# Patient Record
Sex: MALE | Race: WHITE | NOT HISPANIC OR LATINO | Employment: OTHER | URBAN - METROPOLITAN AREA
[De-identification: names, ages, dates, MRNs, and addresses within clinical notes are randomized per-mention and may not be internally consistent; named-entity substitution may affect disease eponyms.]

---

## 2017-03-07 ENCOUNTER — HOSPITAL ENCOUNTER (OUTPATIENT)
Dept: PET IMAGING | Facility: HOSPITAL | Age: 57
Discharge: HOME | End: 2017-03-07
Attending: INTERNAL MEDICINE

## 2017-03-09 ENCOUNTER — TELEPHONE (OUTPATIENT)
Dept: ONCOLOGY | Facility: CLINIC | Age: 57
End: 2017-03-09

## 2017-03-09 NOTE — TELEPHONE ENCOUNTER
----- Message from Erin Hassan sent at 3/9/2017  8:46 AM EST -----  Regarding: missed scan and has not called back to reschedule  Seeing Dr Leonardo   3/13

## 2017-03-13 ENCOUNTER — HOSPITAL ENCOUNTER (OUTPATIENT)
Dept: PET IMAGING | Facility: HOSPITAL | Age: 57
End: 2017-03-13
Attending: INTERNAL MEDICINE

## 2017-03-13 ENCOUNTER — APPOINTMENT (OUTPATIENT)
Dept: ONCOLOGY | Facility: CLINIC | Age: 57
End: 2017-03-13

## 2017-03-13 ENCOUNTER — APPOINTMENT (OUTPATIENT)
Dept: LAB | Facility: HOSPITAL | Age: 57
End: 2017-03-13

## 2017-03-17 ENCOUNTER — APPOINTMENT (OUTPATIENT)
Dept: ONCOLOGY | Facility: CLINIC | Age: 57
End: 2017-03-17

## 2017-03-17 ENCOUNTER — APPOINTMENT (OUTPATIENT)
Dept: LAB | Facility: HOSPITAL | Age: 57
End: 2017-03-17

## 2017-04-13 ENCOUNTER — LAB (OUTPATIENT)
Dept: LAB | Facility: HOSPITAL | Age: 57
End: 2017-04-13

## 2017-04-13 ENCOUNTER — HOSPITAL ENCOUNTER (OUTPATIENT)
Dept: PET IMAGING | Facility: HOSPITAL | Age: 57
Discharge: HOME | End: 2017-04-13
Attending: INTERNAL MEDICINE | Admitting: INTERNAL MEDICINE

## 2017-04-13 DIAGNOSIS — C83.18 MANTLE CELL LYMPHOMA OF LYMPH NODES OF MULTIPLE SITES (HCC): ICD-10-CM

## 2017-04-13 DIAGNOSIS — D80.1 HYPOGAMMAGLOBULINEMIA (HCC): ICD-10-CM

## 2017-04-13 LAB
ALBUMIN SERPL-MCNC: 4.2 G/DL (ref 3.5–5.2)
ALBUMIN/GLOB SERPL: 2.5 G/DL (ref 1.1–2.4)
ALP SERPL-CCNC: 83 U/L (ref 38–116)
ALT SERPL W P-5'-P-CCNC: 9 U/L (ref 0–41)
ANION GAP SERPL CALCULATED.3IONS-SCNC: 17 MMOL/L
AST SERPL-CCNC: 17 U/L (ref 0–40)
BASOPHILS # BLD AUTO: 0.07 10*3/MM3 (ref 0–0.1)
BASOPHILS NFR BLD AUTO: 1 % (ref 0–1.1)
BILIRUB SERPL-MCNC: <0.2 MG/DL (ref 0.1–1.2)
BUN BLD-MCNC: 18 MG/DL (ref 6–20)
BUN/CREAT SERPL: 19.8 (ref 7.3–30)
CALCIUM SPEC-SCNC: 8.9 MG/DL (ref 8.5–10.2)
CHLORIDE SERPL-SCNC: 102 MMOL/L (ref 98–107)
CO2 SERPL-SCNC: 25 MMOL/L (ref 22–29)
CREAT BLD-MCNC: 0.91 MG/DL (ref 0.7–1.3)
CREAT BLDA-MCNC: 1 MG/DL (ref 0.6–1.3)
DEPRECATED RDW RBC AUTO: 46.5 FL (ref 37–49)
EOSINOPHIL # BLD AUTO: 0.42 10*3/MM3 (ref 0–0.36)
EOSINOPHIL NFR BLD AUTO: 5.9 % (ref 1–5)
ERYTHROCYTE [DISTWIDTH] IN BLOOD BY AUTOMATED COUNT: 13.7 % (ref 11.7–14.5)
GFR SERPL CREATININE-BSD FRML MDRD: 86 ML/MIN/1.73
GLOBULIN UR ELPH-MCNC: 1.7 GM/DL (ref 1.8–3.5)
GLUCOSE BLD-MCNC: 158 MG/DL (ref 74–124)
HCT VFR BLD AUTO: 38.5 % (ref 40–49)
HGB BLD-MCNC: 12.9 G/DL (ref 13.5–16.5)
IGA1 MFR SER: 20 MG/DL (ref 70–400)
IGG1 SER-MCNC: 186 MG/DL (ref 700–1600)
IGM SERPL-MCNC: 13 MG/DL (ref 40–230)
IMM GRANULOCYTES # BLD: 0.03 10*3/MM3 (ref 0–0.03)
IMM GRANULOCYTES NFR BLD: 0.4 % (ref 0–0.5)
LDH SERPL-CCNC: 188 U/L (ref 99–259)
LYMPHOCYTES # BLD AUTO: 1.35 10*3/MM3 (ref 1–3.5)
LYMPHOCYTES NFR BLD AUTO: 18.9 % (ref 20–49)
MCH RBC QN AUTO: 30.8 PG (ref 27–33)
MCHC RBC AUTO-ENTMCNC: 33.5 G/DL (ref 32–35)
MCV RBC AUTO: 91.9 FL (ref 83–97)
MONOCYTES # BLD AUTO: 0.59 10*3/MM3 (ref 0.25–0.8)
MONOCYTES NFR BLD AUTO: 8.3 % (ref 4–12)
NEUTROPHILS # BLD AUTO: 4.69 10*3/MM3 (ref 1.5–7)
NEUTROPHILS NFR BLD AUTO: 65.5 % (ref 39–75)
NRBC BLD MANUAL-RTO: 0 /100 WBC (ref 0–0)
PLATELET # BLD AUTO: 268 10*3/MM3 (ref 150–375)
PMV BLD AUTO: 10 FL (ref 8.9–12.1)
POTASSIUM BLD-SCNC: 3.9 MMOL/L (ref 3.5–4.7)
PROT SERPL-MCNC: 5.9 G/DL (ref 6.3–8)
RBC # BLD AUTO: 4.19 10*6/MM3 (ref 4.3–5.5)
SODIUM BLD-SCNC: 144 MMOL/L (ref 134–145)
WBC NRBC COR # BLD: 7.15 10*3/MM3 (ref 4–10)

## 2017-04-13 PROCEDURE — 85025 COMPLETE CBC W/AUTO DIFF WBC: CPT | Performed by: INTERNAL MEDICINE

## 2017-04-13 PROCEDURE — 83615 LACTATE (LD) (LDH) ENZYME: CPT | Performed by: INTERNAL MEDICINE

## 2017-04-13 PROCEDURE — 82565 ASSAY OF CREATININE: CPT

## 2017-04-13 PROCEDURE — 36415 COLL VENOUS BLD VENIPUNCTURE: CPT | Performed by: INTERNAL MEDICINE

## 2017-04-13 PROCEDURE — 80053 COMPREHEN METABOLIC PANEL: CPT | Performed by: INTERNAL MEDICINE

## 2017-04-13 PROCEDURE — 0 DIATRIZOATE MEGLUMINE & SODIUM PER 1 ML: Performed by: INTERNAL MEDICINE

## 2017-04-13 PROCEDURE — 71260 CT THORAX DX C+: CPT

## 2017-04-13 PROCEDURE — 0 IOPAMIDOL 61 % SOLUTION: Performed by: INTERNAL MEDICINE

## 2017-04-13 PROCEDURE — 70491 CT SOFT TISSUE NECK W/DYE: CPT

## 2017-04-13 PROCEDURE — 74177 CT ABD & PELVIS W/CONTRAST: CPT

## 2017-04-13 RX ADMIN — IOPAMIDOL 85 ML: 612 INJECTION, SOLUTION INTRAVENOUS at 14:14

## 2017-04-13 RX ADMIN — DIATRIZOATE MEGLUMINE AND DIATRIZOATE SODIUM 30 ML: 660; 100 LIQUID ORAL; RECTAL at 13:30

## 2017-04-20 ENCOUNTER — APPOINTMENT (OUTPATIENT)
Dept: ONCOLOGY | Facility: CLINIC | Age: 57
End: 2017-04-20

## 2017-04-20 ENCOUNTER — APPOINTMENT (OUTPATIENT)
Dept: LAB | Facility: HOSPITAL | Age: 57
End: 2017-04-20

## 2017-04-20 ENCOUNTER — TELEPHONE (OUTPATIENT)
Dept: ONCOLOGY | Facility: CLINIC | Age: 57
End: 2017-04-20

## 2017-04-20 NOTE — TELEPHONE ENCOUNTER
----- Message from Brianna Davenport sent at 4/20/2017 11:31 AM EDT -----   Pt's wife is because he cannot come for his appointment today and wants to know if MD would call her with CT Results        [phone]    Pt. Wife is asking if dr. Leonardo can call pt. With scan results.  Pt. Has missed many appts. And has also been a no show in the past.  Last seen here by dr. Leonardo was 10/6/16.  Will d/w dr. Leonardo and get back with pt wife.  V/u.

## 2017-04-24 ENCOUNTER — DOCUMENTATION (OUTPATIENT)
Dept: ONCOLOGY | Facility: CLINIC | Age: 57
End: 2017-04-24

## 2017-04-24 NOTE — PROGRESS NOTES
April 24, 2107          Dear Mr. Murray,      I received a phone message from your wife dated 4/20/17 asking me to call with your CT scan results that were performed on 4/13/17 as you were not able to make the appointment.  I have attempted to call the home number we have listed [phone] and also called the cell phone number that was left by your wife which is [phone], but I have been unable to reach anyone.    The CT scan performed 4/13/17 did show slight increase in the size of lymph nodes in the neck, above the collarbones, and some in the back area.  I feel like you need further evaluation potentially with PET scan and possible biopsies etc.  You are welcome to contact my office at your earliest convenience to arrange a follow-up appointment so that we can discuss further testing.    If you are currently seeking care at a different practice, I would be more than happy to discuss with your current physician or for forward any records that they may need to continue your care.      Sincerely,            Eren Leonardo MD  [facility]  [phone]

## 2017-04-25 ENCOUNTER — TELEPHONE (OUTPATIENT)
Dept: ONCOLOGY | Facility: CLINIC | Age: 57
End: 2017-04-25

## 2017-04-25 DIAGNOSIS — C83.13 MANTLE CELL LYMPHOMA OF INTRA-ABDOMINAL LYMPH NODES (HCC): Primary | ICD-10-CM

## 2017-04-28 ENCOUNTER — HOSPITAL ENCOUNTER (OUTPATIENT)
Dept: PET IMAGING | Facility: HOSPITAL | Age: 57
Discharge: HOME | End: 2017-04-28
Attending: INTERNAL MEDICINE | Admitting: INTERNAL MEDICINE

## 2017-04-28 ENCOUNTER — HOSPITAL ENCOUNTER (OUTPATIENT)
Dept: PET IMAGING | Facility: HOSPITAL | Age: 57
Discharge: HOME | End: 2017-04-28
Attending: INTERNAL MEDICINE

## 2017-04-28 DIAGNOSIS — C83.13 MANTLE CELL LYMPHOMA OF INTRA-ABDOMINAL LYMPH NODES (HCC): ICD-10-CM

## 2017-04-28 PROCEDURE — 0 FLUDEOXYGLUCOSE F18 SOLUTION: Performed by: INTERNAL MEDICINE

## 2017-04-28 PROCEDURE — 78815 PET IMAGE W/CT SKULL-THIGH: CPT

## 2017-04-28 PROCEDURE — A9552 F18 FDG: HCPCS | Performed by: INTERNAL MEDICINE

## 2017-04-28 RX ADMIN — FLUDEOXYGLUCOSE F18 1 DOSE: 300 INJECTION INTRAVENOUS at 09:21

## 2017-05-03 ENCOUNTER — OFFICE VISIT (OUTPATIENT)
Dept: ONCOLOGY | Facility: CLINIC | Age: 57
End: 2017-05-03

## 2017-05-03 ENCOUNTER — APPOINTMENT (OUTPATIENT)
Dept: LAB | Facility: HOSPITAL | Age: 57
End: 2017-05-03

## 2017-05-03 VITALS
OXYGEN SATURATION: 98 % | TEMPERATURE: 98.3 F | BODY MASS INDEX: 25.04 KG/M2 | RESPIRATION RATE: 12 BRPM | DIASTOLIC BLOOD PRESSURE: 82 MMHG | SYSTOLIC BLOOD PRESSURE: 124 MMHG | HEIGHT: 66 IN | HEART RATE: 69 BPM | WEIGHT: 155.8 LBS

## 2017-05-03 DIAGNOSIS — C83.18 MANTLE CELL LYMPHOMA OF LYMPH NODES OF MULTIPLE SITES (HCC): Primary | ICD-10-CM

## 2017-05-03 PROCEDURE — 99212-NC PR NO CHARGE CBC OFFICE OUTPATIENT VISIT 10 MINUTES: Performed by: INTERNAL MEDICINE

## 2017-05-03 RX ORDER — ESCITALOPRAM OXALATE 10 MG/1
TABLET ORAL
COMMUNITY
Start: 2017-03-17 | End: 2017-05-23 | Stop reason: DRUGHIGH

## 2017-05-03 RX ORDER — BACLOFEN 10 MG/1
TABLET ORAL AS NEEDED
COMMUNITY
Start: 2017-04-01

## 2017-05-08 ENCOUNTER — DOCUMENTATION (OUTPATIENT)
Dept: ONCOLOGY | Facility: CLINIC | Age: 57
End: 2017-05-08

## 2017-05-23 ENCOUNTER — OFFICE VISIT (OUTPATIENT)
Dept: FAMILY MEDICINE CLINIC | Facility: CLINIC | Age: 57
End: 2017-05-23

## 2017-05-23 ENCOUNTER — TELEPHONE (OUTPATIENT)
Dept: FAMILY MEDICINE CLINIC | Facility: CLINIC | Age: 57
End: 2017-05-23

## 2017-05-23 VITALS
SYSTOLIC BLOOD PRESSURE: 150 MMHG | OXYGEN SATURATION: 98 % | BODY MASS INDEX: 25.08 KG/M2 | HEART RATE: 86 BPM | RESPIRATION RATE: 16 BRPM | TEMPERATURE: 97.6 F | WEIGHT: 159.8 LBS | DIASTOLIC BLOOD PRESSURE: 80 MMHG | HEIGHT: 67 IN

## 2017-05-23 DIAGNOSIS — I10 ESSENTIAL HYPERTENSION: ICD-10-CM

## 2017-05-23 DIAGNOSIS — F41.8 DEPRESSION WITH ANXIETY: ICD-10-CM

## 2017-05-23 DIAGNOSIS — Z72.0 TOBACCO ABUSE: ICD-10-CM

## 2017-05-23 DIAGNOSIS — T30.0 FLASH BURN: ICD-10-CM

## 2017-05-23 DIAGNOSIS — E11.9 TYPE 2 DIABETES MELLITUS WITHOUT COMPLICATION, WITHOUT LONG-TERM CURRENT USE OF INSULIN (HCC): ICD-10-CM

## 2017-05-23 DIAGNOSIS — C83.18 MANTLE CELL LYMPHOMA OF LYMPH NODES OF MULTIPLE SITES (HCC): Primary | ICD-10-CM

## 2017-05-23 PROCEDURE — 99214 OFFICE O/P EST MOD 30 MIN: CPT | Performed by: NURSE PRACTITIONER

## 2017-05-23 RX ORDER — HYDROXYZINE HYDROCHLORIDE 25 MG/1
25 TABLET, FILM COATED ORAL EVERY 8 HOURS PRN
Qty: 90 TABLET | Refills: 1 | Status: SHIPPED | OUTPATIENT
Start: 2017-05-23 | End: 2017-07-21 | Stop reason: SDUPTHER

## 2017-05-23 RX ORDER — MONTELUKAST SODIUM 10 MG/1
TABLET ORAL
COMMUNITY
Start: 2017-05-03 | End: 2017-06-20 | Stop reason: SDUPTHER

## 2017-05-23 RX ORDER — ESCITALOPRAM OXALATE 20 MG/1
20 TABLET ORAL DAILY
Qty: 30 TABLET | Refills: 1 | Status: SHIPPED | OUTPATIENT
Start: 2017-05-23 | End: 2017-06-20 | Stop reason: SDUPTHER

## 2017-06-20 RX ORDER — ESCITALOPRAM OXALATE 20 MG/1
20 TABLET ORAL DAILY
Qty: 90 TABLET | Refills: 0 | Status: SHIPPED | OUTPATIENT
Start: 2017-06-20 | End: 2017-08-30 | Stop reason: SDUPTHER

## 2017-06-20 RX ORDER — MONTELUKAST SODIUM 10 MG/1
10 TABLET ORAL NIGHTLY
Qty: 90 TABLET | Refills: 0 | Status: SHIPPED | OUTPATIENT
Start: 2017-06-20 | End: 2017-08-30 | Stop reason: SDUPTHER

## 2017-06-20 RX ORDER — LISINOPRIL 40 MG/1
40 TABLET ORAL DAILY
Qty: 90 TABLET | Refills: 0 | Status: SHIPPED | OUTPATIENT
Start: 2017-06-20 | End: 2017-08-30 | Stop reason: SDUPTHER

## 2017-06-20 RX ORDER — GABAPENTIN 300 MG/1
300 CAPSULE ORAL 3 TIMES DAILY
Qty: 270 CAPSULE | Refills: 0 | Status: SHIPPED | OUTPATIENT
Start: 2017-06-20 | End: 2017-09-01 | Stop reason: SDUPTHER

## 2017-06-20 RX ORDER — METOPROLOL TARTRATE 50 MG/1
50 TABLET, FILM COATED ORAL DAILY
Qty: 90 TABLET | Refills: 0 | Status: SHIPPED | OUTPATIENT
Start: 2017-06-20 | End: 2017-08-30 | Stop reason: SDUPTHER

## 2017-06-20 RX ORDER — OMEPRAZOLE 20 MG/1
20 CAPSULE, DELAYED RELEASE ORAL DAILY
Qty: 90 CAPSULE | Refills: 0 | Status: SHIPPED | OUTPATIENT
Start: 2017-06-20 | End: 2017-08-30 | Stop reason: SDUPTHER

## 2017-06-20 NOTE — TELEPHONE ENCOUNTER
Received refill request from [facility] pharmacy. Refilled for 90 days no refills. Will be due for FU

## 2017-07-10 ENCOUNTER — TELEPHONE (OUTPATIENT)
Dept: FAMILY MEDICINE CLINIC | Facility: CLINIC | Age: 57
End: 2017-07-10

## 2017-07-10 NOTE — TELEPHONE ENCOUNTER
Pt informed he needs to do a follow up. Made appt    ----- Message from ILIANA Parrish sent at 7/10/2017 10:08 AM EDT -----  Reviewed notes from [facility], pls make FU apt to discuss mood and DM, may need updated labs    ----- Message -----     From: Tom Henley     Sent: 7/6/2017   1:08 PM       To: ILIANA Parrish

## 2017-07-21 RX ORDER — HYDROXYZINE HYDROCHLORIDE 25 MG/1
25 TABLET, FILM COATED ORAL EVERY 8 HOURS PRN
Qty: 90 TABLET | Refills: 1 | Status: SHIPPED | OUTPATIENT
Start: 2017-07-21 | End: 2017-11-07 | Stop reason: SDUPTHER

## 2017-07-24 ENCOUNTER — OFFICE VISIT (OUTPATIENT)
Dept: FAMILY MEDICINE CLINIC | Facility: CLINIC | Age: 57
End: 2017-07-24

## 2017-07-24 VITALS
SYSTOLIC BLOOD PRESSURE: 128 MMHG | HEIGHT: 67 IN | WEIGHT: 163 LBS | DIASTOLIC BLOOD PRESSURE: 78 MMHG | HEART RATE: 82 BPM | TEMPERATURE: 98.4 F | BODY MASS INDEX: 25.58 KG/M2 | OXYGEN SATURATION: 96 %

## 2017-07-24 DIAGNOSIS — Z12.5 PROSTATE CANCER SCREENING: ICD-10-CM

## 2017-07-24 DIAGNOSIS — C83.18 MANTLE CELL LYMPHOMA OF LYMPH NODES OF MULTIPLE SITES (HCC): ICD-10-CM

## 2017-07-24 DIAGNOSIS — M79.641 BILATERAL HAND PAIN: ICD-10-CM

## 2017-07-24 DIAGNOSIS — Z11.59 NEED FOR HEPATITIS C SCREENING TEST: ICD-10-CM

## 2017-07-24 DIAGNOSIS — I10 ESSENTIAL HYPERTENSION: ICD-10-CM

## 2017-07-24 DIAGNOSIS — E11.9 TYPE 2 DIABETES MELLITUS WITHOUT COMPLICATION, WITHOUT LONG-TERM CURRENT USE OF INSULIN (HCC): Primary | ICD-10-CM

## 2017-07-24 DIAGNOSIS — Z72.0 TOBACCO ABUSE: ICD-10-CM

## 2017-07-24 DIAGNOSIS — M10.9 GOUT OF LEFT FOOT, UNSPECIFIED CAUSE, UNSPECIFIED CHRONICITY: ICD-10-CM

## 2017-07-24 DIAGNOSIS — M79.642 BILATERAL HAND PAIN: ICD-10-CM

## 2017-07-24 LAB
ALBUMIN SERPL-MCNC: 4.3 G/DL (ref 3.5–5.2)
ALBUMIN UR-MCNC: 0 MG/L (ref 0–20)
ALBUMIN/GLOB SERPL: 1.7 G/DL
ALP SERPL-CCNC: 84 U/L (ref 39–117)
ALT SERPL W P-5'-P-CCNC: 12 U/L (ref 1–41)
ANION GAP SERPL CALCULATED.3IONS-SCNC: 15.9 MMOL/L
AST SERPL-CCNC: 19 U/L (ref 1–40)
BACTERIA UR QL AUTO: NORMAL /HPF
BILIRUB SERPL-MCNC: 0.3 MG/DL (ref 0.1–1.2)
BILIRUB UR QL STRIP: NEGATIVE
BUN BLD-MCNC: 15 MG/DL (ref 6–20)
BUN/CREAT SERPL: 19.2 (ref 7–25)
CALCIUM SPEC-SCNC: 9.6 MG/DL (ref 8.6–10.5)
CHLORIDE SERPL-SCNC: 100 MMOL/L (ref 98–107)
CHROMATIN AB SERPL-ACNC: <10 IU/ML (ref 0–14)
CLARITY UR: CLEAR
CO2 SERPL-SCNC: 23.1 MMOL/L (ref 22–29)
COLOR UR: YELLOW
CREAT BLD-MCNC: 0.78 MG/DL (ref 0.76–1.27)
CRP SERPL-MCNC: 1.62 MG/DL (ref 0–0.5)
ERYTHROCYTE [DISTWIDTH] IN BLOOD BY AUTOMATED COUNT: 13.6 % (ref 4.5–15)
ERYTHROCYTE [SEDIMENTATION RATE] IN BLOOD: 10 MM/HR (ref 0–20)
GFR SERPL CREATININE-BSD FRML MDRD: 103 ML/MIN/1.73
GLOBULIN UR ELPH-MCNC: 2.5 GM/DL
GLUCOSE BLD-MCNC: 105 MG/DL (ref 65–99)
GLUCOSE UR STRIP-MCNC: NEGATIVE MG/DL
HBA1C MFR BLD: 6.1 % (ref 4.8–5.6)
HCT VFR BLD AUTO: 43.2 % (ref 35–60)
HCV AB SER DONR QL: NORMAL
HGB BLD-MCNC: 14 G/DL (ref 13.5–18)
HGB UR QL STRIP.AUTO: ABNORMAL
KETONES UR QL STRIP: NEGATIVE
LEUKOCYTE ESTERASE UR QL STRIP.AUTO: NEGATIVE
LYMPHOCYTES # BLD AUTO: 1.2 10*3/MM3 (ref 1.2–3.4)
LYMPHOCYTES NFR BLD AUTO: 19 % (ref 21–51)
MCH RBC QN AUTO: 29.7 PG (ref 26.1–33.1)
MCHC RBC AUTO-ENTMCNC: 32.5 G/DL (ref 33–37)
MCV RBC AUTO: 91.5 FL (ref 80–99)
MONOCYTES # BLD AUTO: 0.5 10*3/MM3 (ref 0.1–0.6)
MONOCYTES NFR BLD AUTO: 7.2 % (ref 2–9)
NEUTROPHILS # BLD AUTO: 4.8 10*3/MM3 (ref 1.4–6.5)
NEUTROPHILS NFR BLD AUTO: 73.8 % (ref 42–75)
NITRITE UR QL STRIP: NEGATIVE
PH UR STRIP.AUTO: 5.5 [PH] (ref 4.6–8)
PLATELET # BLD AUTO: 279 10*3/MM3 (ref 150–450)
PMV BLD AUTO: 6.5 FL (ref 7.1–10.5)
POTASSIUM BLD-SCNC: 4.3 MMOL/L (ref 3.5–5.2)
PROT SERPL-MCNC: 6.8 G/DL (ref 6–8.5)
PROT UR QL STRIP: NEGATIVE
PSA SERPL-MCNC: 0.38 NG/ML (ref 0–4)
RBC # BLD AUTO: 4.72 10*6/MM3 (ref 4–6)
RBC # UR: NORMAL /HPF
REF LAB TEST METHOD: NORMAL
SODIUM BLD-SCNC: 139 MMOL/L (ref 136–145)
SP GR UR STRIP: <=1.005 (ref 1–1.03)
SQUAMOUS #/AREA URNS HPF: NORMAL /HPF
TSH SERPL DL<=0.05 MIU/L-ACNC: 1.13 MIU/ML (ref 0.27–4.2)
URATE SERPL-MCNC: 5.6 MG/DL (ref 3.4–7)
UROBILINOGEN UR QL STRIP: ABNORMAL
WBC NRBC COR # BLD: 6.5 10*3/MM3 (ref 4.5–10)
WBC UR QL AUTO: NORMAL /HPF

## 2017-07-24 PROCEDURE — 84550 ASSAY OF BLOOD/URIC ACID: CPT | Performed by: NURSE PRACTITIONER

## 2017-07-24 PROCEDURE — 36415 COLL VENOUS BLD VENIPUNCTURE: CPT | Performed by: NURSE PRACTITIONER

## 2017-07-24 PROCEDURE — 86038 ANTINUCLEAR ANTIBODIES: CPT | Performed by: NURSE PRACTITIONER

## 2017-07-24 PROCEDURE — 99214 OFFICE O/P EST MOD 30 MIN: CPT | Performed by: NURSE PRACTITIONER

## 2017-07-24 PROCEDURE — 82043 UR ALBUMIN QUANTITATIVE: CPT | Performed by: NURSE PRACTITIONER

## 2017-07-24 PROCEDURE — 80053 COMPREHEN METABOLIC PANEL: CPT | Performed by: NURSE PRACTITIONER

## 2017-07-24 PROCEDURE — 85025 COMPLETE CBC W/AUTO DIFF WBC: CPT | Performed by: NURSE PRACTITIONER

## 2017-07-24 PROCEDURE — 86140 C-REACTIVE PROTEIN: CPT | Performed by: NURSE PRACTITIONER

## 2017-07-24 PROCEDURE — 85652 RBC SED RATE AUTOMATED: CPT | Performed by: NURSE PRACTITIONER

## 2017-07-24 PROCEDURE — 83036 HEMOGLOBIN GLYCOSYLATED A1C: CPT | Performed by: NURSE PRACTITIONER

## 2017-07-24 PROCEDURE — 86803 HEPATITIS C AB TEST: CPT | Performed by: NURSE PRACTITIONER

## 2017-07-24 PROCEDURE — 84443 ASSAY THYROID STIM HORMONE: CPT | Performed by: NURSE PRACTITIONER

## 2017-07-24 PROCEDURE — 84153 ASSAY OF PSA TOTAL: CPT | Performed by: NURSE PRACTITIONER

## 2017-07-24 PROCEDURE — 86431 RHEUMATOID FACTOR QUANT: CPT | Performed by: NURSE PRACTITIONER

## 2017-07-24 PROCEDURE — 81001 URINALYSIS AUTO W/SCOPE: CPT | Performed by: NURSE PRACTITIONER

## 2017-07-24 RX ORDER — BUPROPION HYDROCHLORIDE 150 MG/1
TABLET, EXTENDED RELEASE ORAL
Qty: 60 TABLET | Refills: 2 | Status: SHIPPED | OUTPATIENT
Start: 2017-07-24 | End: 2017-08-30 | Stop reason: SINTOL

## 2017-07-24 NOTE — PROGRESS NOTES
Subjective   Abdelrahman Murray is a 57 y.o. male.     History of Present Illness   Here to FU on DM2 on meformin 500 mg daily, occ takes lantus prn doesn't freq use d/t well controlled BS, checks BS at home 103, last dilated eye exam > 1 year, with L foot pain x few mo fairly constant pain 4/10, with hx of gout years ago tx medication wondering if L LE pain is r/t gout, wears high boots d/t ankle OA, no rubbing or skin breakdown  With HTN on metoprolol and lisinopril 40 mg daily, checks BP at home states runs similar or higher, no CP HA dizziness LE edema  With lumbar pain and radiculopathy on gabapentin 300 mg, baclofen 10 mg, seeing chiropractor helping somewhat, wondering if L LE pain could be sciatica with hx, no injury, trauma or trigger event, c/o B hand pain swelling and stiffness wondering if has OA or needs to start medication  With gerd sx stable on omeprazole 20 mg   With depression and anxiety on lexapro 20 mg daily and hydroxyzine 25 mg thinks helped at first but no longer helping, smoker 1 ppd x 38 years, never tried wellbutrin  Saw Dr Hamilton [facility] for mantle cell and monitoring elevated lymph nodes doesn't recommend continuing chemo    The following portions of the patient's history were reviewed and updated as appropriate: allergies, current medications, past family history, past medical history, past social history, past surgical history and problem list.    Review of Systems   Constitutional: Negative for fever.   Respiratory: Negative for cough, shortness of breath and wheezing.    Cardiovascular: Negative for chest pain, palpitations and leg swelling.   Gastrointestinal: Negative for abdominal distention and abdominal pain.        Gerd   Endocrine: Negative for cold intolerance, heat intolerance, polydipsia, polyphagia and polyuria.   Musculoskeletal: Positive for arthralgias, back pain, joint swelling and myalgias. Negative for gait problem, neck pain and neck stiffness.    Allergic/Immunologic: Positive for environmental allergies.   Neurological: Positive for numbness. Negative for dizziness and headaches.   Hematological: Positive for adenopathy.   Psychiatric/Behavioral: Positive for agitation, dysphoric mood and sleep disturbance. Negative for behavioral problems, confusion, decreased concentration, hallucinations, self-injury and suicidal ideas. The patient is nervous/anxious. The patient is not hyperactive.    All other systems reviewed and are negative.      Objective   Physical Exam   Constitutional: He is oriented to person, place, and time. He appears well-developed and well-nourished.   HENT:   Head: Normocephalic and atraumatic.   Eyes: Conjunctivae and EOM are normal. Pupils are equal, round, and reactive to light.   Cardiovascular: Normal rate, regular rhythm and normal heart sounds.    Pulmonary/Chest: Effort normal and breath sounds normal.   Musculoskeletal: Normal range of motion. He exhibits tenderness (L lateral shin no erythema or edema, no skin breakdown, FROM L foot and ankle).    Abdelrahman had a diabetic foot exam performed (sensation intact, pulses strong, well hydrated, fungal toenails, no ulcers) today.  Neurological: He is alert and oriented to person, place, and time.   Skin: Skin is warm and dry.   Psychiatric: His behavior is normal. Judgment and thought content normal.   hyperverbal   Vitals reviewed.      Assessment/Plan   Abdelrahman was seen today for follow-up and gout.    Diagnoses and all orders for this visit:    Type 2 diabetes mellitus without complication, without long-term current use of insulin  -     CBC & Differential  -     Comprehensive Metabolic Panel  -     TSH  -     Hemoglobin A1c  -     MicroAlbumin, Urine, Random  -     Urinalysis With Microscopic  -     CBC Auto Differential  -     Urinalysis  -     Urinalysis, Microscopic Only    Essential hypertension  -     Comprehensive Metabolic Panel  -     TSH    Tobacco abuse    Prostate cancer  screening  -     PSA    Gout of left foot, unspecified cause, unspecified chronicity  -     Uric Acid    Need for hepatitis C screening test  -     Hepatitis C Antibody    Bilateral hand pain  -     KYM  -     C-reactive Protein  -     Rheumatoid Factor, Quant  -     Sedimentation Rate    Mantle cell lymphoma of lymph nodes of multiple sites    Other orders  -     buPROPion SR (WELLBUTRIN SR) 150 MG 12 hr tablet; 1 PO AM x 3 days then 1 PO BID    check labs and call with results, cont all chronic dz meds, trial wellbutrin  mg 1 PO AM x 3 days then BID and enc smoking cessation, will re-eval mood in 1 mo FU apt, cont FU with [place] [facility], check BP and BS at home, enc overdue dilated eye exam, DM foot exam

## 2017-07-24 NOTE — PATIENT INSTRUCTIONS
check labs and call with results, cont all chronic dz meds, trial wellbutrin  mg 1 PO AM x 3 days then BID and enc smoking cessation, will re-eval mood in 1 mo FU apt, cont FU with [facility], check BP and BS at home, enc overdue dilated eye exam, DM foot exam

## 2017-07-25 ENCOUNTER — TELEPHONE (OUTPATIENT)
Dept: FAMILY MEDICINE CLINIC | Facility: CLINIC | Age: 57
End: 2017-07-25

## 2017-07-25 LAB — ANA SER QL: NEGATIVE

## 2017-07-25 RX ORDER — ACETAMINOPHEN 500 MG
500 TABLET ORAL EVERY 6 HOURS PRN
Qty: 60 TABLET | Refills: 1 | Status: SHIPPED | OUTPATIENT
Start: 2017-07-25

## 2017-07-25 NOTE — TELEPHONE ENCOUNTER
Autoimmune panel negative for rheumatoid but elevated inflammatory marker. Hep C screening negative, normal. Thyroid, kidney, liver, prostate normal. Uric acid normal. BS sl elevated 105 A1C 6.1 well controlled on metformin 500 mg daily.     As you are allergic to Aspirin and concerned about GI upset recommend tyelnol extra strength erx to pharmacy

## 2017-08-30 ENCOUNTER — OFFICE VISIT (OUTPATIENT)
Dept: FAMILY MEDICINE CLINIC | Facility: CLINIC | Age: 57
End: 2017-08-30

## 2017-08-30 VITALS
HEART RATE: 95 BPM | DIASTOLIC BLOOD PRESSURE: 80 MMHG | OXYGEN SATURATION: 99 % | SYSTOLIC BLOOD PRESSURE: 142 MMHG | HEIGHT: 67 IN | RESPIRATION RATE: 16 BRPM | TEMPERATURE: 98.4 F | BODY MASS INDEX: 25.08 KG/M2 | WEIGHT: 159.8 LBS

## 2017-08-30 DIAGNOSIS — I10 ESSENTIAL HYPERTENSION: Primary | ICD-10-CM

## 2017-08-30 DIAGNOSIS — E11.9 TYPE 2 DIABETES MELLITUS WITHOUT COMPLICATION, WITHOUT LONG-TERM CURRENT USE OF INSULIN (HCC): ICD-10-CM

## 2017-08-30 DIAGNOSIS — F41.9 ANXIETY AND DEPRESSION: ICD-10-CM

## 2017-08-30 DIAGNOSIS — F32.A ANXIETY AND DEPRESSION: ICD-10-CM

## 2017-08-30 DIAGNOSIS — K21.9 GASTROESOPHAGEAL REFLUX DISEASE, ESOPHAGITIS PRESENCE NOT SPECIFIED: ICD-10-CM

## 2017-08-30 DIAGNOSIS — J30.2 SEASONAL ALLERGIC RHINITIS, UNSPECIFIED ALLERGIC RHINITIS TRIGGER: ICD-10-CM

## 2017-08-30 PROCEDURE — 99213 OFFICE O/P EST LOW 20 MIN: CPT | Performed by: NURSE PRACTITIONER

## 2017-08-30 RX ORDER — LISINOPRIL 40 MG/1
40 TABLET ORAL DAILY
Qty: 90 TABLET | Refills: 0 | Status: SHIPPED | OUTPATIENT
Start: 2017-08-30 | End: 2017-09-01 | Stop reason: SDUPTHER

## 2017-08-30 RX ORDER — ESCITALOPRAM OXALATE 20 MG/1
20 TABLET ORAL DAILY
Qty: 30 TABLET | Refills: 0 | Status: SHIPPED | OUTPATIENT
Start: 2017-08-30 | End: 2018-02-26

## 2017-08-30 RX ORDER — MONTELUKAST SODIUM 10 MG/1
10 TABLET ORAL NIGHTLY
Qty: 90 TABLET | Refills: 0 | Status: SHIPPED | OUTPATIENT
Start: 2017-08-30

## 2017-08-30 RX ORDER — METOPROLOL TARTRATE 50 MG/1
50 TABLET, FILM COATED ORAL DAILY
Qty: 90 TABLET | Refills: 0 | Status: SHIPPED | OUTPATIENT
Start: 2017-08-30 | End: 2017-09-01 | Stop reason: SDUPTHER

## 2017-08-30 RX ORDER — OMEPRAZOLE 20 MG/1
20 CAPSULE, DELAYED RELEASE ORAL DAILY
Qty: 90 CAPSULE | Refills: 0 | Status: SHIPPED | OUTPATIENT
Start: 2017-08-30 | End: 2017-12-21 | Stop reason: SDUPTHER

## 2017-08-30 NOTE — PROGRESS NOTES
"Subjective   Abdelrahman Murray is a 57 y.o. male.     History of Present Illness   C/o medication problem, was seen on 7/24/17 by Vane frank, tried wellbutrin, he states he was \"drunk\" while on this medication, he states he took this med 1 time, did not take again, he is still taking lexapro and hydroxyzine, he states he needs refills on medications as his dogs ate his medications that fell off the counter over the weekend. He states he has been without his medications for a couple of days, he states \"all of his medications got mixed together\" when his dogs knocked them off the table. He has checked his BS at home, checked Saturday 123, he states he is feeling good, doing well, he has a hx of mantle cell lymphoma, saw oncologist at [facility] Dr. Hamilton. He states he is not taking chemo. He states he can't sleep, \"can't slow head down.\" he states he has seen several psychiatrists. He states he has been dx with PTSD, bipolar, ADHD.     The following portions of the patient's history were reviewed and updated as appropriate: allergies, current medications, past family history, past medical history, past social history, past surgical history and problem list.    Review of Systems   Constitutional: Negative for chills, diaphoresis and fever.   Eyes: Negative for photophobia and visual disturbance.   Respiratory: Negative for cough and shortness of breath.    Cardiovascular: Negative for chest pain.   Musculoskeletal: Negative for arthralgias and myalgias.   Skin: Negative for pallor.   Neurological: Negative for dizziness, light-headedness and headaches.   Psychiatric/Behavioral: Positive for sleep disturbance. Negative for behavioral problems, confusion, dysphoric mood, self-injury and suicidal ideas. The patient is nervous/anxious.    All other systems reviewed and are negative.      Objective   Physical Exam   Constitutional: He is oriented to person, place, and time. He appears well-developed and well-nourished. "   HENT:   Head: Normocephalic.   Eyes: Pupils are equal, round, and reactive to light.   Neck: Normal range of motion. Neck supple.   Cardiovascular: Normal rate, regular rhythm and normal heart sounds.    Pulmonary/Chest: Effort normal and breath sounds normal.   Musculoskeletal: Normal range of motion.   Lymphadenopathy:     He has no cervical adenopathy.   Neurological: He is alert and oriented to person, place, and time.   Skin: Skin is warm and dry.   Psychiatric: He has a normal mood and affect. His speech is normal and behavior is normal. Judgment and thought content normal. His mood appears not anxious. His affect is not angry and not inappropriate. Cognition and memory are normal. He does not exhibit a depressed mood. He expresses no homicidal and no suicidal ideation. He expresses no suicidal plans and no homicidal plans.   Nursing note and vitals reviewed.      Assessment/Plan   Abdelrahman was seen today for medication problem.    Diagnoses and all orders for this visit:    Essential hypertension    Type 2 diabetes mellitus without complication, without long-term current use of insulin    Anxiety and depression    Gastroesophageal reflux disease, esophagitis presence not specified    Seasonal allergic rhinitis, unspecified allergic rhinitis trigger    Other orders  -     metFORMIN (GLUCOPHAGE) 500 MG tablet; Take 1 tablet by mouth Daily.  -     metoprolol tartrate (LOPRESSOR) 50 MG tablet; Take 1 tablet by mouth Daily.  -     lisinopril (PRINIVIL,ZESTRIL) 40 MG tablet; Take 1 tablet by mouth Daily.  -     escitalopram (LEXAPRO) 20 MG tablet; Take 1 tablet by mouth Daily.  -     montelukast (SINGULAIR) 10 MG tablet; Take 1 tablet by mouth Every Night.  -     omeprazole (priLOSEC) 20 MG capsule; Take 1 capsule by mouth Daily.      Refilled metformin, metoprolol, lisinopril, omeprazole, singular, and lexapro, unable to refill gabapentin d/t no [place] license.   Cont lexapro and atarax as prescribed.  Refer to  psychiatry, list of names given to patient.   If any suicidal ideations or dysphoric thoughts advised to go to the ER.  Increase fluid intake, get plenty of rest.   Patient agrees with plan of care and understands instructions. Call if worsening symptoms or any problems or concerns.

## 2017-09-01 RX ORDER — GABAPENTIN 300 MG/1
CAPSULE ORAL
Qty: 270 CAPSULE | Refills: 0 | Status: SHIPPED | OUTPATIENT
Start: 2017-09-01 | End: 2017-09-01 | Stop reason: SDUPTHER

## 2017-09-01 RX ORDER — GABAPENTIN 300 MG/1
300 CAPSULE ORAL 3 TIMES DAILY
Qty: 270 CAPSULE | Refills: 0 | Status: SHIPPED | OUTPATIENT
Start: 2017-09-01 | End: 2018-01-24 | Stop reason: SDUPTHER

## 2017-09-01 RX ORDER — METOPROLOL TARTRATE 50 MG/1
TABLET, FILM COATED ORAL
Qty: 90 TABLET | Refills: 0 | Status: SHIPPED | OUTPATIENT
Start: 2017-09-01 | End: 2017-11-07 | Stop reason: SDUPTHER

## 2017-09-01 RX ORDER — LISINOPRIL 40 MG/1
TABLET ORAL
Qty: 90 TABLET | Refills: 0 | Status: SHIPPED | OUTPATIENT
Start: 2017-09-01 | End: 2017-11-07 | Stop reason: SDUPTHER

## 2017-11-07 RX ORDER — HYDROXYZINE HYDROCHLORIDE 25 MG/1
TABLET, FILM COATED ORAL
Qty: 90 TABLET | Refills: 0 | Status: SHIPPED | OUTPATIENT
Start: 2017-11-07 | End: 2018-01-24 | Stop reason: SDUPTHER

## 2017-11-07 RX ORDER — LISINOPRIL 40 MG/1
TABLET ORAL
Qty: 90 TABLET | Refills: 0 | Status: SHIPPED | OUTPATIENT
Start: 2017-11-07 | End: 2018-04-25 | Stop reason: SDUPTHER

## 2017-11-07 RX ORDER — METOPROLOL TARTRATE 50 MG/1
TABLET, FILM COATED ORAL
Qty: 90 TABLET | Refills: 0 | Status: SHIPPED | OUTPATIENT
Start: 2017-11-07 | End: 2018-07-20 | Stop reason: SDUPTHER

## 2017-12-21 ENCOUNTER — OFFICE VISIT (OUTPATIENT)
Dept: FAMILY MEDICINE CLINIC | Facility: CLINIC | Age: 57
End: 2017-12-21

## 2017-12-21 VITALS
HEIGHT: 67 IN | WEIGHT: 165 LBS | TEMPERATURE: 98.2 F | HEART RATE: 70 BPM | BODY MASS INDEX: 25.9 KG/M2 | DIASTOLIC BLOOD PRESSURE: 80 MMHG | SYSTOLIC BLOOD PRESSURE: 130 MMHG | OXYGEN SATURATION: 96 %

## 2017-12-21 DIAGNOSIS — E78.49 OTHER HYPERLIPIDEMIA: ICD-10-CM

## 2017-12-21 DIAGNOSIS — E11.9 DIABETES MELLITUS WITHOUT COMPLICATION (HCC): ICD-10-CM

## 2017-12-21 DIAGNOSIS — I10 HYPERTENSION, UNSPECIFIED TYPE: Primary | ICD-10-CM

## 2017-12-21 LAB
ALBUMIN SERPL-MCNC: 4.5 G/DL (ref 3.5–5.2)
ALBUMIN/GLOB SERPL: 1.9 G/DL
ALP SERPL-CCNC: 95 U/L (ref 39–117)
ALT SERPL W P-5'-P-CCNC: 10 U/L (ref 1–41)
ANION GAP SERPL CALCULATED.3IONS-SCNC: 14.7 MMOL/L
AST SERPL-CCNC: 13 U/L (ref 1–40)
BILIRUB SERPL-MCNC: 0.2 MG/DL (ref 0.1–1.2)
BUN BLD-MCNC: 11 MG/DL (ref 6–20)
BUN/CREAT SERPL: 11.3 (ref 7–25)
CALCIUM SPEC-SCNC: 10.1 MG/DL (ref 8.6–10.5)
CHLORIDE SERPL-SCNC: 102 MMOL/L (ref 98–107)
CHOLEST SERPL-MCNC: 224 MG/DL (ref 0–200)
CO2 SERPL-SCNC: 24.3 MMOL/L (ref 22–29)
CREAT BLD-MCNC: 0.97 MG/DL (ref 0.76–1.27)
GFR SERPL CREATININE-BSD FRML MDRD: 80 ML/MIN/1.73
GLOBULIN UR ELPH-MCNC: 2.4 GM/DL
GLUCOSE BLD-MCNC: 93 MG/DL (ref 65–99)
HBA1C MFR BLD: 5.2 % (ref 4.8–5.6)
HDLC SERPL-MCNC: 62 MG/DL (ref 40–60)
LDLC SERPL CALC-MCNC: 136 MG/DL (ref 0–100)
LDLC/HDLC SERPL: 2.19 {RATIO}
POTASSIUM BLD-SCNC: 4 MMOL/L (ref 3.5–5.2)
PROT SERPL-MCNC: 6.9 G/DL (ref 6–8.5)
SODIUM BLD-SCNC: 141 MMOL/L (ref 136–145)
TRIGL SERPL-MCNC: 132 MG/DL (ref 0–150)
VLDLC SERPL-MCNC: 26.4 MG/DL (ref 5–40)

## 2017-12-21 PROCEDURE — 83036 HEMOGLOBIN GLYCOSYLATED A1C: CPT | Performed by: INTERNAL MEDICINE

## 2017-12-21 PROCEDURE — 36415 COLL VENOUS BLD VENIPUNCTURE: CPT | Performed by: INTERNAL MEDICINE

## 2017-12-21 PROCEDURE — 99214 OFFICE O/P EST MOD 30 MIN: CPT | Performed by: INTERNAL MEDICINE

## 2017-12-21 PROCEDURE — 80053 COMPREHEN METABOLIC PANEL: CPT | Performed by: INTERNAL MEDICINE

## 2017-12-21 PROCEDURE — 80061 LIPID PANEL: CPT | Performed by: INTERNAL MEDICINE

## 2017-12-21 RX ORDER — FLUOXETINE HYDROCHLORIDE 40 MG/1
80 CAPSULE ORAL DAILY
Qty: 180 CAPSULE | Refills: 3 | Status: SHIPPED | OUTPATIENT
Start: 2017-12-21 | End: 2018-01-03 | Stop reason: SDUPTHER

## 2017-12-21 RX ORDER — ROSUVASTATIN CALCIUM 10 MG/1
10 TABLET, COATED ORAL DAILY
Qty: 30 TABLET | Refills: 1 | Status: SHIPPED | OUTPATIENT
Start: 2017-12-21 | End: 2017-12-21 | Stop reason: SDUPTHER

## 2017-12-21 RX ORDER — METOPROLOL TARTRATE 50 MG/1
50 TABLET, FILM COATED ORAL
Status: CANCELLED | OUTPATIENT
Start: 2017-12-21

## 2017-12-21 RX ORDER — ROSUVASTATIN CALCIUM 10 MG/1
10 TABLET, COATED ORAL DAILY
Qty: 30 TABLET | Refills: 1 | Status: SHIPPED | OUTPATIENT
Start: 2017-12-21 | End: 2018-01-03 | Stop reason: SDUPTHER

## 2017-12-21 RX ORDER — OMEPRAZOLE 20 MG/1
20 CAPSULE, DELAYED RELEASE ORAL
COMMUNITY
End: 2018-02-26 | Stop reason: SDUPTHER

## 2017-12-21 RX ORDER — METOPROLOL TARTRATE 50 MG/1
50 TABLET, FILM COATED ORAL
COMMUNITY
End: 2018-06-15 | Stop reason: SDUPTHER

## 2017-12-21 RX ORDER — TRAMADOL HYDROCHLORIDE 50 MG/1
TABLET ORAL
COMMUNITY
Start: 2017-10-18 | End: 2018-02-26

## 2017-12-21 RX ORDER — OMEPRAZOLE 20 MG/1
20 CAPSULE, DELAYED RELEASE ORAL DAILY
Qty: 90 CAPSULE | Refills: 0 | Status: SHIPPED | OUTPATIENT
Start: 2017-12-21

## 2017-12-21 RX ORDER — CYCLOBENZAPRINE HCL 5 MG
TABLET ORAL
COMMUNITY
Start: 2017-10-18 | End: 2018-02-26

## 2017-12-21 NOTE — PROGRESS NOTES
Subjective   Abdelrahman Murray is a 57 y.o. male.   Follow-up on diabetes blood pressure lipids  History of Present Illness   Glu 115-125 she should arrange watching dietary habits well blood pressures have not been a problem either  Myalgias on all but crestor.  Insurance would not cover this so is off medicine    Has been treated apparently satisfactory from mantle cell lymphoma followed by [facility] just watchful waiting with routine follow-ups for that.  Doing well and feeling good.    Review of Systems   Constitutional: Negative.    HENT: Negative.    Eyes: Negative.    Respiratory: Negative.    Cardiovascular: Negative.    Gastrointestinal: Negative.    Endocrine: Negative.    Genitourinary: Negative.    Musculoskeletal: Negative.    Skin: Negative.    Allergic/Immunologic: Negative.    Neurological: Negative.    Hematological: Negative.    Psychiatric/Behavioral: Negative.        Objective   Physical Exam   Constitutional: He appears well-developed and well-nourished.   HENT:   Head: Normocephalic and atraumatic.   Eyes: Conjunctivae are normal. Pupils are equal, round, and reactive to light.   Cardiovascular: Normal rate, regular rhythm and normal heart sounds.    Pulmonary/Chest: Effort normal.   Abdominal: Soft.   Neurological: He is alert.   Normal gait and station   Skin: Skin is warm and dry.   Nursing note and vitals reviewed.      Assessment/Plan   1.  Hypertension controlled plan continue present medicine recheck in 6 months.    2.  Type 2 diabetes controlled by history plan await current lab if good recheck in 6 months    3.  Hyperlipidemia plan await lab if lipids are elevated we'll wait till after January 1 patient's and get a prescription filled for Crestor to see if his insurance will cover the next insurance year at 10 mg daily with follow-up lab work in 6 weeks.    Much of this encounter note is an electronic transcription/translation of spoken language to printed text.  The electronic  translation of spoken language may permit erroneous, or at times, nonsensical words or phrases to be inadvertently transcribed.  Although I have reviewed the note for such errors, some may still exist.

## 2017-12-27 DIAGNOSIS — E78.5 HYPERLIPIDEMIA, UNSPECIFIED HYPERLIPIDEMIA TYPE: Primary | ICD-10-CM

## 2018-01-03 DIAGNOSIS — I10 HYPERTENSION, UNSPECIFIED TYPE: ICD-10-CM

## 2018-01-03 DIAGNOSIS — E78.49 OTHER HYPERLIPIDEMIA: ICD-10-CM

## 2018-01-03 DIAGNOSIS — E11.9 DIABETES MELLITUS WITHOUT COMPLICATION (HCC): ICD-10-CM

## 2018-01-03 RX ORDER — FLUOXETINE HYDROCHLORIDE 40 MG/1
80 CAPSULE ORAL DAILY
Qty: 180 CAPSULE | Refills: 3 | Status: SHIPPED | OUTPATIENT
Start: 2018-01-03 | End: 2018-01-10 | Stop reason: SDUPTHER

## 2018-01-03 RX ORDER — ROSUVASTATIN CALCIUM 10 MG/1
10 TABLET, COATED ORAL DAILY
Qty: 90 TABLET | Refills: 1 | Status: SHIPPED | OUTPATIENT
Start: 2018-01-03 | End: 2018-01-10 | Stop reason: SDUPTHER

## 2018-01-10 DIAGNOSIS — E78.49 OTHER HYPERLIPIDEMIA: ICD-10-CM

## 2018-01-10 DIAGNOSIS — I10 HYPERTENSION, UNSPECIFIED TYPE: ICD-10-CM

## 2018-01-10 DIAGNOSIS — E11.9 DIABETES MELLITUS WITHOUT COMPLICATION (HCC): ICD-10-CM

## 2018-01-10 RX ORDER — FLUOXETINE HYDROCHLORIDE 40 MG/1
80 CAPSULE ORAL DAILY
Qty: 180 CAPSULE | Refills: 3 | Status: SHIPPED | OUTPATIENT
Start: 2018-01-10

## 2018-01-10 RX ORDER — ROSUVASTATIN CALCIUM 10 MG/1
10 TABLET, COATED ORAL DAILY
Qty: 90 TABLET | Refills: 1 | Status: SHIPPED | OUTPATIENT
Start: 2018-01-10 | End: 2018-02-26 | Stop reason: SINTOL

## 2018-01-25 RX ORDER — GABAPENTIN 300 MG/1
CAPSULE ORAL
Qty: 270 CAPSULE | Refills: 1 | OUTPATIENT
Start: 2018-01-25

## 2018-01-25 RX ORDER — HYDROXYZINE HYDROCHLORIDE 25 MG/1
TABLET, FILM COATED ORAL
Qty: 90 TABLET | Refills: 1 | Status: SHIPPED | OUTPATIENT
Start: 2018-01-25 | End: 2018-03-29 | Stop reason: SDUPTHER

## 2018-02-26 ENCOUNTER — OFFICE VISIT (OUTPATIENT)
Dept: FAMILY MEDICINE CLINIC | Facility: CLINIC | Age: 58
End: 2018-02-26

## 2018-02-26 VITALS
OXYGEN SATURATION: 95 % | HEART RATE: 80 BPM | TEMPERATURE: 97.9 F | SYSTOLIC BLOOD PRESSURE: 128 MMHG | HEIGHT: 67 IN | DIASTOLIC BLOOD PRESSURE: 80 MMHG | BODY MASS INDEX: 25.74 KG/M2 | WEIGHT: 164 LBS

## 2018-02-26 DIAGNOSIS — G89.29 CHRONIC LEFT-SIDED LOW BACK PAIN WITH LEFT-SIDED SCIATICA: Primary | ICD-10-CM

## 2018-02-26 DIAGNOSIS — M51.36 DDD (DEGENERATIVE DISC DISEASE), LUMBAR: ICD-10-CM

## 2018-02-26 DIAGNOSIS — M54.42 CHRONIC LEFT-SIDED LOW BACK PAIN WITH LEFT-SIDED SCIATICA: Primary | ICD-10-CM

## 2018-02-26 PROBLEM — E04.1 THYROID NODULE: Status: ACTIVE | Noted: 2018-02-26

## 2018-02-26 PROCEDURE — 72100 X-RAY EXAM L-S SPINE 2/3 VWS: CPT | Performed by: NURSE PRACTITIONER

## 2018-02-26 PROCEDURE — 99213 OFFICE O/P EST LOW 20 MIN: CPT | Performed by: NURSE PRACTITIONER

## 2018-02-26 RX ORDER — ACETAMINOPHEN AND CODEINE PHOSPHATE 300; 30 MG/1; MG/1
1 TABLET ORAL DAILY PRN
Qty: 30 TABLET | Refills: 0 | Status: SHIPPED | OUTPATIENT
Start: 2018-02-26

## 2018-02-26 NOTE — PROGRESS NOTES
Subjective   Abdelrahman Murray is a 58 y.o. male.     History of Present Illness   C/o L sided LBP with LE sciatica x 1 year comes and goes, has seen chiropactor but not much improvement, pain 6/10, with numbness and burning, some falls d/t pain, no bowel or bladder incont, no injury trauma or trigger event, has tried tylenol and aleve not much help, has seen PM in past but doesn't want strong pain medicine, has tried tramadol and tylenol #3 in past and thought helped, request restart prn, has taken baclofen and gabapentin in past as well  With HTN on metoprolol no CP dizziness HA LE debbie, With DM2 -140s on metformin 500 mg, last A1C 5.2 12/17, mood stable on fluoxetine 40 mg 2 PO daily  Has seen [facility] for Mantle Cell lymphoma, no longer needs chemo and states overall feels great    The following portions of the patient's history were reviewed and updated as appropriate: allergies, current medications, past family history, past medical history, past social history, past surgical history and problem list.    Review of Systems   Constitutional: Negative for fever.   Respiratory: Negative for cough, shortness of breath and wheezing.    Cardiovascular: Negative for chest pain, palpitations and leg swelling.   Musculoskeletal: Positive for arthralgias, back pain, gait problem and myalgias. Negative for joint swelling, neck pain and neck stiffness.   Neurological: Positive for numbness. Negative for dizziness and headaches.   All other systems reviewed and are negative.      Objective   Physical Exam   Constitutional: He is oriented to person, place, and time. He appears well-developed and well-nourished.   HENT:   Head: Normocephalic and atraumatic.   Eyes: Conjunctivae and EOM are normal. Pupils are equal, round, and reactive to light.   Cardiovascular: Normal rate, regular rhythm and normal heart sounds.    Pulmonary/Chest: Effort normal and breath sounds normal.   Musculoskeletal: Normal range of motion. He  exhibits tenderness (lumbar midline with stiff ROM and gait).   Neurological: He is alert and oriented to person, place, and time.   Skin: Skin is warm and dry.   Psychiatric: He has a normal mood and affect. His behavior is normal. Judgment and thought content normal.   Vitals reviewed.  lumbar xray 2v without comparison for chronic LBP with sciatica shows DDD, awaiting radiology review    Assessment/Plan   Abdelrahman was seen today for back pain.    Diagnoses and all orders for this visit:    Chronic left-sided low back pain with left-sided sciatica  -     XR Spine Lumbar 2 or 3 View  -     MRI Lumbar Spine Without Contrast; Future  -     Ambulatory Referral to Physical Therapy Evaluate and treat    DDD (degenerative disc disease), lumbar  -     MRI Lumbar Spine Without Contrast; Future  -     Ambulatory Referral to Physical Therapy Evaluate and treat    Other orders  -     acetaminophen-codeine (TYLENOL #3) 300-30 MG per tablet; Take 1 tablet by mouth Daily As Needed for Moderate Pain .    refer PT, xray showing DDD order MRI, The patient has read and signed the [facility] Controlled Substance Contract UTD today, lizzie reviewed and within normal limits, Rx tylenol #3 1 PO daily and monitor.  I will continue to see patient for regular follow up appointments.  They are well controlled on their medication.  LIZZIE is updated every 3 months. The patient is aware of the potential for addiction and dependence.

## 2018-02-26 NOTE — PATIENT INSTRUCTIONS
refer PT, xray showing DDD order MRI, The patient has read and signed the [facility] Controlled Substance Contract UTD today, sandy reviewed and within normal limits, Rx tylenol #3 1 PO daily and monitor.  I will continue to see patient for regular follow up appointments.  They are well controlled on their medication.  SANDY is updated every 3 months. The patient is aware of the potential for addiction and dependence.

## 2018-03-02 ENCOUNTER — TELEPHONE (OUTPATIENT)
Dept: FAMILY MEDICINE CLINIC | Facility: CLINIC | Age: 58
End: 2018-03-02

## 2018-03-02 NOTE — TELEPHONE ENCOUNTER
Lumbar spine shows arthritis. I have prescribed tylenol #3 daily, how is pain? I can refer to PT or PM if he would like

## 2018-03-07 ENCOUNTER — APPOINTMENT (OUTPATIENT)
Dept: MRI IMAGING | Facility: HOSPITAL | Age: 58
End: 2018-03-07

## 2018-03-28 ENCOUNTER — TELEPHONE (OUTPATIENT)
Dept: FAMILY MEDICINE CLINIC | Facility: CLINIC | Age: 58
End: 2018-03-28

## 2018-03-28 DIAGNOSIS — M51.36 DDD (DEGENERATIVE DISC DISEASE), LUMBAR: ICD-10-CM

## 2018-03-28 DIAGNOSIS — M54.50 CHRONIC BILATERAL LOW BACK PAIN WITHOUT SCIATICA: Primary | ICD-10-CM

## 2018-03-28 DIAGNOSIS — G89.29 CHRONIC BILATERAL LOW BACK PAIN WITHOUT SCIATICA: Primary | ICD-10-CM

## 2018-03-29 RX ORDER — HYDROXYZINE HYDROCHLORIDE 25 MG/1
TABLET, FILM COATED ORAL
Qty: 90 TABLET | Refills: 1 | Status: ON HOLD | OUTPATIENT
Start: 2018-03-29 | End: 2018-05-24 | Stop reason: SDUPTHER

## 2018-04-26 RX ORDER — LISINOPRIL 40 MG/1
TABLET ORAL
Qty: 90 TABLET | Refills: 0 | Status: SHIPPED | OUTPATIENT
Start: 2018-04-26 | End: 2018-06-01 | Stop reason: SDUPTHER

## 2018-04-26 RX ORDER — METOPROLOL TARTRATE 50 MG/1
TABLET, FILM COATED ORAL
Qty: 90 TABLET | Refills: 0 | Status: SHIPPED | OUTPATIENT
Start: 2018-04-26 | End: 2018-06-15 | Stop reason: SDUPTHER

## 2018-05-07 ENCOUNTER — TELEPHONE (OUTPATIENT)
Dept: FAMILY MEDICINE CLINIC | Facility: CLINIC | Age: 58
End: 2018-05-07

## 2018-05-07 NOTE — TELEPHONE ENCOUNTER
Wants a call back about patients fmla papers, she has some things she needs to go over and discuss.

## 2018-05-10 ENCOUNTER — OFFICE VISIT (OUTPATIENT)
Dept: FAMILY MEDICINE CLINIC | Facility: CLINIC | Age: 58
End: 2018-05-10

## 2018-05-10 VITALS
OXYGEN SATURATION: 97 % | HEIGHT: 67 IN | TEMPERATURE: 98.2 F | SYSTOLIC BLOOD PRESSURE: 128 MMHG | HEART RATE: 70 BPM | DIASTOLIC BLOOD PRESSURE: 78 MMHG | BODY MASS INDEX: 26.37 KG/M2 | WEIGHT: 168 LBS

## 2018-05-10 DIAGNOSIS — F10.10 ALCOHOL ABUSE: ICD-10-CM

## 2018-05-10 DIAGNOSIS — F41.9 ANXIETY AND DEPRESSION: ICD-10-CM

## 2018-05-10 DIAGNOSIS — F32.A ANXIETY AND DEPRESSION: ICD-10-CM

## 2018-05-10 DIAGNOSIS — Z09 HOSPITAL DISCHARGE FOLLOW-UP: Primary | ICD-10-CM

## 2018-05-10 PROCEDURE — 99213 OFFICE O/P EST LOW 20 MIN: CPT | Performed by: NURSE PRACTITIONER

## 2018-05-10 RX ORDER — FOLIC ACID 1 MG/1
TABLET ORAL
COMMUNITY
Start: 2018-05-03 | End: 2018-06-01 | Stop reason: SDUPTHER

## 2018-05-10 RX ORDER — DIVALPROEX SODIUM 500 MG/1
TABLET, DELAYED RELEASE ORAL
COMMUNITY
Start: 2018-05-03 | End: 2018-06-01 | Stop reason: SDUPTHER

## 2018-05-10 RX ORDER — RISPERIDONE 2 MG/1
TABLET ORAL
COMMUNITY
Start: 2018-05-03 | End: 2018-06-01 | Stop reason: SDUPTHER

## 2018-05-10 RX ORDER — ROSUVASTATIN CALCIUM 10 MG/1
10 TABLET, COATED ORAL DAILY
COMMUNITY

## 2018-05-10 RX ORDER — VITAMIN B COMPLEX
CAPSULE ORAL
COMMUNITY
Start: 2018-05-03

## 2018-05-10 NOTE — PATIENT INSTRUCTIONS
Will obtain [facility] d/c hawk for review.   Patient's wife FMLA completed.   List of psychs given to patient, he will call for appt. Treatment centers for addiction given to patient.   If any worsening symptoms, suicidal ideations advised to go to the ER.  Increase fluid intake, get plenty of rest.    Patient agrees with plan of care and understands instructions. Call if worsening symptoms or any problems or concerns.

## 2018-05-10 NOTE — PROGRESS NOTES
Subjective   Abdelrahman Murray is a 58 y.o. male.     History of Present Illness   Here to f/u from hospital visit, went to [facility] ER on 5/3/18, went for psychiatric evaluation, he states he stopped drinking alcohol when he was admitted, he states he was admitted for 7 days, feeling better, states medication is going well. He is working on outpatient psych eval, trying to see pysch he had before. He denies suicidal ideations or depression, he was started on Depakote, Risperdal, decreased prozac to 40mg daily, started b complex vitamin, also taking zinc, his wife is in the room with him today, hx of lymphoma, he does see oncologist at [place].   His wife needs FMLA completed.     The following portions of the patient's history were reviewed and updated as appropriate: allergies, current medications, past family history, past medical history, past social history, past surgical history and problem list.    Review of Systems   Constitutional: Negative for chills, diaphoresis and fever.   Respiratory: Negative for cough and shortness of breath.    Cardiovascular: Negative for chest pain.   Musculoskeletal: Negative for arthralgias and myalgias.   Skin: Negative for pallor.   Neurological: Negative for dizziness, light-headedness and headache.   All other systems reviewed and are negative.      Objective   Physical Exam   Constitutional: He is oriented to person, place, and time. He appears well-developed and well-nourished.   HENT:   Head: Normocephalic.   Eyes: Pupils are equal, round, and reactive to light.   Neck: Normal range of motion.   Cardiovascular: Normal rate, regular rhythm and normal heart sounds.    Pulmonary/Chest: Effort normal and breath sounds normal.   Musculoskeletal: Normal range of motion.   Lymphadenopathy:     He has no cervical adenopathy.   Neurological: He is alert and oriented to person, place, and time.   Skin: Skin is warm and dry.   Psychiatric: He has a normal mood and affect. His behavior  is normal. Judgment and thought content normal. His speech is rapid and/or pressured. Cognition and memory are normal. He expresses no homicidal and no suicidal ideation. He expresses no suicidal plans and no homicidal plans.   Nursing note and vitals reviewed.        Assessment/Plan   Abdelrahman was seen today for follow-up.    Diagnoses and all orders for this visit:    Hospital discharge follow-up    Anxiety and depression    Alcohol abuse      Will obtain [facility] d/c hawk for review.   Patient's wife FMLA completed.   List of psychs given to patient, he will call for appt. Treatment centers for addiction given to patient.   If any worsening symptoms, suicidal ideations advised to go to the ER.  Increase fluid intake, get plenty of rest.    Patient agrees with plan of care and understands instructions. Call if worsening symptoms or any problems or concerns.

## 2018-05-21 ENCOUNTER — APPOINTMENT (OUTPATIENT)
Dept: GENERAL RADIOLOGY | Facility: HOSPITAL | Age: 58
End: 2018-05-21

## 2018-05-21 ENCOUNTER — HOSPITAL ENCOUNTER (INPATIENT)
Facility: HOSPITAL | Age: 58
LOS: 2 days | Discharge: LEFT BEFORE DISCHARGE | End: 2018-05-24
Attending: EMERGENCY MEDICINE | Admitting: INTERNAL MEDICINE

## 2018-05-21 DIAGNOSIS — J18.9 PNEUMONIA OF RIGHT LOWER LOBE DUE TO INFECTIOUS ORGANISM: Primary | ICD-10-CM

## 2018-05-21 DIAGNOSIS — C85.10 B-CELL LYMPHOMA, UNSPECIFIED B-CELL LYMPHOMA TYPE, UNSPECIFIED BODY REGION (HCC): ICD-10-CM

## 2018-05-21 DIAGNOSIS — A41.9 SEPSIS, DUE TO UNSPECIFIED ORGANISM: ICD-10-CM

## 2018-05-21 LAB
ARTERIAL PATENCY WRIST A: POSITIVE
ATMOSPHERIC PRESS: 749.7 MMHG
BASE EXCESS BLDA CALC-SCNC: -1.5 MMOL/L (ref 0–2)
BASOPHILS # BLD AUTO: 0.01 10*3/MM3 (ref 0–0.2)
BASOPHILS NFR BLD AUTO: 0.1 % (ref 0–1.5)
BDY SITE: ABNORMAL
DEPRECATED RDW RBC AUTO: 45.5 FL (ref 37–54)
EOSINOPHIL # BLD AUTO: 0.01 10*3/MM3 (ref 0–0.7)
EOSINOPHIL NFR BLD AUTO: 0.1 % (ref 0.3–6.2)
ERYTHROCYTE [DISTWIDTH] IN BLOOD BY AUTOMATED COUNT: 13.3 % (ref 11.5–14.5)
GAS FLOW AIRWAY: 3 LPM
HCO3 BLDA-SCNC: 21.3 MMOL/L (ref 22–28)
HCT VFR BLD AUTO: 39.7 % (ref 40.4–52.2)
HGB BLD-MCNC: 13.1 G/DL (ref 13.7–17.6)
IMM GRANULOCYTES # BLD: 0.03 10*3/MM3 (ref 0–0.03)
IMM GRANULOCYTES NFR BLD: 0.2 % (ref 0–0.5)
LYMPHOCYTES # BLD AUTO: 0.58 10*3/MM3 (ref 0.9–4.8)
LYMPHOCYTES NFR BLD AUTO: 4 % (ref 19.6–45.3)
MCH RBC QN AUTO: 30.9 PG (ref 27–32.7)
MCHC RBC AUTO-ENTMCNC: 33 G/DL (ref 32.6–36.4)
MCV RBC AUTO: 93.6 FL (ref 79.8–96.2)
MODALITY: ABNORMAL
MONOCYTES # BLD AUTO: 0.4 10*3/MM3 (ref 0.2–1.2)
MONOCYTES NFR BLD AUTO: 2.8 % (ref 5–12)
NEUTROPHILS # BLD AUTO: 13.47 10*3/MM3 (ref 1.9–8.1)
NEUTROPHILS NFR BLD AUTO: 92.8 % (ref 42.7–76)
PCO2 BLDA: 28.9 MM HG (ref 35–45)
PH BLDA: 7.47 PH UNITS (ref 7.35–7.45)
PLATELET # BLD AUTO: 207 10*3/MM3 (ref 140–500)
PMV BLD AUTO: 10.2 FL (ref 6–12)
PO2 BLDA: 57 MM HG (ref 80–100)
RBC # BLD AUTO: 4.24 10*6/MM3 (ref 4.6–6)
SAO2 % BLDCOA: 91.6 % (ref 92–99)
SET MECH RESP RATE: 22
WBC NRBC COR # BLD: 14.5 10*3/MM3 (ref 4.5–10.7)

## 2018-05-21 PROCEDURE — 93010 ELECTROCARDIOGRAM REPORT: CPT | Performed by: INTERNAL MEDICINE

## 2018-05-21 PROCEDURE — 85379 FIBRIN DEGRADATION QUANT: CPT | Performed by: EMERGENCY MEDICINE

## 2018-05-21 PROCEDURE — 87150 DNA/RNA AMPLIFIED PROBE: CPT | Performed by: EMERGENCY MEDICINE

## 2018-05-21 PROCEDURE — 82803 BLOOD GASES ANY COMBINATION: CPT

## 2018-05-21 PROCEDURE — 84145 PROCALCITONIN (PCT): CPT | Performed by: EMERGENCY MEDICINE

## 2018-05-21 PROCEDURE — 85610 PROTHROMBIN TIME: CPT | Performed by: EMERGENCY MEDICINE

## 2018-05-21 PROCEDURE — 87040 BLOOD CULTURE FOR BACTERIA: CPT | Performed by: EMERGENCY MEDICINE

## 2018-05-21 PROCEDURE — 80307 DRUG TEST PRSMV CHEM ANLYZR: CPT | Performed by: INTERNAL MEDICINE

## 2018-05-21 PROCEDURE — 87186 SC STD MICRODIL/AGAR DIL: CPT | Performed by: EMERGENCY MEDICINE

## 2018-05-21 PROCEDURE — 93005 ELECTROCARDIOGRAM TRACING: CPT | Performed by: EMERGENCY MEDICINE

## 2018-05-21 PROCEDURE — 80053 COMPREHEN METABOLIC PANEL: CPT | Performed by: EMERGENCY MEDICINE

## 2018-05-21 PROCEDURE — 81001 URINALYSIS AUTO W/SCOPE: CPT | Performed by: EMERGENCY MEDICINE

## 2018-05-21 PROCEDURE — 80307 DRUG TEST PRSMV CHEM ANLYZR: CPT | Performed by: EMERGENCY MEDICINE

## 2018-05-21 PROCEDURE — 87147 CULTURE TYPE IMMUNOLOGIC: CPT | Performed by: EMERGENCY MEDICINE

## 2018-05-21 PROCEDURE — 71045 X-RAY EXAM CHEST 1 VIEW: CPT

## 2018-05-21 PROCEDURE — 85025 COMPLETE CBC W/AUTO DIFF WBC: CPT | Performed by: EMERGENCY MEDICINE

## 2018-05-21 PROCEDURE — 83735 ASSAY OF MAGNESIUM: CPT | Performed by: EMERGENCY MEDICINE

## 2018-05-21 PROCEDURE — 36600 WITHDRAWAL OF ARTERIAL BLOOD: CPT

## 2018-05-21 PROCEDURE — 83880 ASSAY OF NATRIURETIC PEPTIDE: CPT | Performed by: EMERGENCY MEDICINE

## 2018-05-21 PROCEDURE — 84484 ASSAY OF TROPONIN QUANT: CPT | Performed by: EMERGENCY MEDICINE

## 2018-05-21 PROCEDURE — 83605 ASSAY OF LACTIC ACID: CPT | Performed by: EMERGENCY MEDICINE

## 2018-05-21 PROCEDURE — 99284 EMERGENCY DEPT VISIT MOD MDM: CPT

## 2018-05-21 RX ORDER — OLANZAPINE 10 MG/1
10 INJECTION, POWDER, LYOPHILIZED, FOR SOLUTION INTRAMUSCULAR ONCE
Status: COMPLETED | OUTPATIENT
Start: 2018-05-21 | End: 2018-05-21

## 2018-05-21 RX ORDER — SODIUM CHLORIDE 0.9 % (FLUSH) 0.9 %
10 SYRINGE (ML) INJECTION AS NEEDED
Status: DISCONTINUED | OUTPATIENT
Start: 2018-05-21 | End: 2018-05-23

## 2018-05-21 RX ORDER — ACETAMINOPHEN 650 MG/1
650 SUPPOSITORY RECTAL ONCE
Status: COMPLETED | OUTPATIENT
Start: 2018-05-21 | End: 2018-05-21

## 2018-05-21 RX ORDER — ONDANSETRON 2 MG/ML
4 INJECTION INTRAMUSCULAR; INTRAVENOUS ONCE
Status: DISCONTINUED | OUTPATIENT
Start: 2018-05-21 | End: 2018-05-21

## 2018-05-21 RX ORDER — ACETAMINOPHEN 500 MG
1000 TABLET ORAL ONCE
Status: DISCONTINUED | OUTPATIENT
Start: 2018-05-21 | End: 2018-05-22

## 2018-05-21 RX ORDER — HYDROMORPHONE HYDROCHLORIDE 1 MG/ML
0.5 INJECTION, SOLUTION INTRAMUSCULAR; INTRAVENOUS; SUBCUTANEOUS ONCE
Status: DISCONTINUED | OUTPATIENT
Start: 2018-05-21 | End: 2018-05-21

## 2018-05-21 RX ADMIN — OLANZAPINE 10 MG: 10 INJECTION, POWDER, FOR SOLUTION INTRAMUSCULAR at 23:43

## 2018-05-21 RX ADMIN — ACETAMINOPHEN 650 MG: 650 SUPPOSITORY RECTAL at 23:33

## 2018-05-22 ENCOUNTER — APPOINTMENT (OUTPATIENT)
Dept: CT IMAGING | Facility: HOSPITAL | Age: 58
End: 2018-05-22

## 2018-05-22 PROBLEM — J18.9 RLL PNEUMONIA: Status: ACTIVE | Noted: 2018-05-22

## 2018-05-22 LAB
ALBUMIN SERPL-MCNC: 3.6 G/DL (ref 3.5–5.2)
ALBUMIN/GLOB SERPL: 1.3 G/DL
ALP SERPL-CCNC: 92 U/L (ref 39–117)
ALT SERPL W P-5'-P-CCNC: 9 U/L (ref 1–41)
AMMONIA BLD-SCNC: 100 UMOL/L (ref 16–60)
AMPHET+METHAMPHET UR QL: POSITIVE
ANION GAP SERPL CALCULATED.3IONS-SCNC: 16.2 MMOL/L
ANION GAP SERPL CALCULATED.3IONS-SCNC: 16.6 MMOL/L
AST SERPL-CCNC: 11 U/L (ref 1–40)
B PERT DNA SPEC QL NAA+PROBE: NOT DETECTED
BACTERIA BLD CULT: ABNORMAL
BACTERIA UR QL AUTO: NORMAL /HPF
BARBITURATES UR QL SCN: NEGATIVE
BENZODIAZ UR QL SCN: NEGATIVE
BILIRUB SERPL-MCNC: 0.5 MG/DL (ref 0.1–1.2)
BILIRUB UR QL STRIP: NEGATIVE
BUN BLD-MCNC: 12 MG/DL (ref 6–20)
BUN BLD-MCNC: 13 MG/DL (ref 6–20)
BUN/CREAT SERPL: 11.4 (ref 7–25)
BUN/CREAT SERPL: 12 (ref 7–25)
C PNEUM DNA NPH QL NAA+NON-PROBE: NOT DETECTED
CALCIUM SPEC-SCNC: 8.6 MG/DL (ref 8.6–10.5)
CALCIUM SPEC-SCNC: 9.1 MG/DL (ref 8.6–10.5)
CANNABINOIDS SERPL QL: POSITIVE
CHLORIDE SERPL-SCNC: 102 MMOL/L (ref 98–107)
CHLORIDE SERPL-SCNC: 98 MMOL/L (ref 98–107)
CLARITY UR: CLEAR
CO2 SERPL-SCNC: 20.8 MMOL/L (ref 22–29)
CO2 SERPL-SCNC: 22.4 MMOL/L (ref 22–29)
COCAINE UR QL: NEGATIVE
COLOR UR: YELLOW
CREAT BLD-MCNC: 1 MG/DL (ref 0.76–1.27)
CREAT BLD-MCNC: 1.14 MG/DL (ref 0.76–1.27)
D DIMER PPP FEU-MCNC: 1.73 MCGFEU/ML (ref 0–0.49)
D-LACTATE SERPL-SCNC: 1.8 MMOL/L (ref 0.5–2)
D-LACTATE SERPL-SCNC: 2.2 MMOL/L (ref 0.5–2)
D-LACTATE SERPL-SCNC: 2.5 MMOL/L (ref 0.5–2)
D-LACTATE SERPL-SCNC: 3.1 MMOL/L (ref 0.5–2)
DEPRECATED RDW RBC AUTO: 46.2 FL (ref 37–54)
ERYTHROCYTE [DISTWIDTH] IN BLOOD BY AUTOMATED COUNT: 13.3 % (ref 11.5–14.5)
ETHANOL BLD-MCNC: <10 MG/DL (ref 0–10)
ETHANOL UR QL: <0.01 %
FLUAV H1 2009 PAND RNA NPH QL NAA+PROBE: NOT DETECTED
FLUAV H1 HA GENE NPH QL NAA+PROBE: NOT DETECTED
FLUAV H3 RNA NPH QL NAA+PROBE: NOT DETECTED
FLUAV SUBTYP SPEC NAA+PROBE: NOT DETECTED
FLUBV RNA ISLT QL NAA+PROBE: NOT DETECTED
GFR SERPL CREATININE-BSD FRML MDRD: 66 ML/MIN/1.73
GFR SERPL CREATININE-BSD FRML MDRD: 77 ML/MIN/1.73
GLOBULIN UR ELPH-MCNC: 2.8 GM/DL
GLUCOSE BLD-MCNC: 151 MG/DL (ref 65–99)
GLUCOSE BLD-MCNC: 199 MG/DL (ref 65–99)
GLUCOSE BLDC GLUCOMTR-MCNC: 105 MG/DL (ref 70–130)
GLUCOSE BLDC GLUCOMTR-MCNC: 142 MG/DL (ref 70–130)
GLUCOSE BLDC GLUCOMTR-MCNC: 160 MG/DL (ref 70–130)
GLUCOSE BLDC GLUCOMTR-MCNC: 185 MG/DL (ref 70–130)
GLUCOSE BLDC GLUCOMTR-MCNC: 93 MG/DL (ref 70–130)
GLUCOSE UR STRIP-MCNC: ABNORMAL MG/DL
HADV DNA SPEC NAA+PROBE: NOT DETECTED
HBA1C MFR BLD: 5.7 % (ref 4.8–5.6)
HCOV 229E RNA SPEC QL NAA+PROBE: NOT DETECTED
HCOV HKU1 RNA SPEC QL NAA+PROBE: NOT DETECTED
HCOV NL63 RNA SPEC QL NAA+PROBE: NOT DETECTED
HCOV OC43 RNA SPEC QL NAA+PROBE: NOT DETECTED
HCT VFR BLD AUTO: 37.5 % (ref 40.4–52.2)
HGB BLD-MCNC: 12 G/DL (ref 13.7–17.6)
HGB UR QL STRIP.AUTO: ABNORMAL
HMPV RNA NPH QL NAA+NON-PROBE: NOT DETECTED
HOLD SPECIMEN: NORMAL
HPIV1 RNA SPEC QL NAA+PROBE: NOT DETECTED
HPIV2 RNA SPEC QL NAA+PROBE: NOT DETECTED
HPIV3 RNA NPH QL NAA+PROBE: NOT DETECTED
HPIV4 P GENE NPH QL NAA+PROBE: NOT DETECTED
HYALINE CASTS UR QL AUTO: NORMAL /LPF
INR PPP: 1.22 (ref 0.9–1.1)
KETONES UR QL STRIP: ABNORMAL
L PNEUMO1 AG UR QL IA: NEGATIVE
LEUKOCYTE ESTERASE UR QL STRIP.AUTO: NEGATIVE
M PNEUMO IGG SER IA-ACNC: NOT DETECTED
MAGNESIUM SERPL-MCNC: 2 MG/DL (ref 1.6–2.6)
MAGNESIUM SERPL-MCNC: 2.1 MG/DL (ref 1.6–2.6)
MCH RBC QN AUTO: 30.6 PG (ref 27–32.7)
MCHC RBC AUTO-ENTMCNC: 32 G/DL (ref 32.6–36.4)
MCV RBC AUTO: 95.7 FL (ref 79.8–96.2)
METHADONE UR QL SCN: NEGATIVE
NITRITE UR QL STRIP: NEGATIVE
NT-PROBNP SERPL-MCNC: 1851 PG/ML (ref 5–900)
OPIATES UR QL: NEGATIVE
OXYCODONE UR QL SCN: NEGATIVE
PH UR STRIP.AUTO: 5.5 [PH] (ref 5–8)
PLATELET # BLD AUTO: 162 10*3/MM3 (ref 140–500)
PMV BLD AUTO: 10.5 FL (ref 6–12)
POTASSIUM BLD-SCNC: 3.3 MMOL/L (ref 3.5–5.2)
POTASSIUM BLD-SCNC: 3.4 MMOL/L (ref 3.5–5.2)
PROCALCITONIN SERPL-MCNC: 3.07 NG/ML (ref 0.1–0.25)
PROT SERPL-MCNC: 6.4 G/DL (ref 6–8.5)
PROT UR QL STRIP: ABNORMAL
PROTHROMBIN TIME: 15.2 SECONDS (ref 11.7–14.2)
RBC # BLD AUTO: 3.92 10*6/MM3 (ref 4.6–6)
RBC # UR: NORMAL /HPF
REF LAB TEST METHOD: NORMAL
RHINOVIRUS RNA SPEC NAA+PROBE: NOT DETECTED
RSV RNA NPH QL NAA+NON-PROBE: NOT DETECTED
S PNEUM AG SPEC QL LA: NEGATIVE
SODIUM BLD-SCNC: 137 MMOL/L (ref 136–145)
SODIUM BLD-SCNC: 139 MMOL/L (ref 136–145)
SP GR UR STRIP: 1.02 (ref 1–1.03)
SQUAMOUS #/AREA URNS HPF: NORMAL /HPF
TROPONIN T SERPL-MCNC: <0.01 NG/ML (ref 0–0.03)
UROBILINOGEN UR QL STRIP: ABNORMAL
VALPROATE SERPL-MCNC: 55 MCG/ML (ref 50–125)
WBC NRBC COR # BLD: 15.29 10*3/MM3 (ref 4.5–10.7)
WBC UR QL AUTO: NORMAL /HPF
WHOLE BLOOD HOLD SPECIMEN: NORMAL
WHOLE BLOOD HOLD SPECIMEN: NORMAL

## 2018-05-22 PROCEDURE — 36415 COLL VENOUS BLD VENIPUNCTURE: CPT | Performed by: EMERGENCY MEDICINE

## 2018-05-22 PROCEDURE — 83036 HEMOGLOBIN GLYCOSYLATED A1C: CPT | Performed by: INTERNAL MEDICINE

## 2018-05-22 PROCEDURE — 87040 BLOOD CULTURE FOR BACTERIA: CPT | Performed by: EMERGENCY MEDICINE

## 2018-05-22 PROCEDURE — 70450 CT HEAD/BRAIN W/O DYE: CPT

## 2018-05-22 PROCEDURE — 0 IOPAMIDOL PER 1 ML: Performed by: INTERNAL MEDICINE

## 2018-05-22 PROCEDURE — 87899 AGENT NOS ASSAY W/OPTIC: CPT | Performed by: INTERNAL MEDICINE

## 2018-05-22 PROCEDURE — 80048 BASIC METABOLIC PNL TOTAL CA: CPT | Performed by: INTERNAL MEDICINE

## 2018-05-22 PROCEDURE — 71275 CT ANGIOGRAPHY CHEST: CPT

## 2018-05-22 PROCEDURE — 25010000002 VANCOMYCIN 10 G RECONSTITUTED SOLUTION: Performed by: EMERGENCY MEDICINE

## 2018-05-22 PROCEDURE — 82962 GLUCOSE BLOOD TEST: CPT

## 2018-05-22 PROCEDURE — 74177 CT ABD & PELVIS W/CONTRAST: CPT

## 2018-05-22 PROCEDURE — 82140 ASSAY OF AMMONIA: CPT | Performed by: INTERNAL MEDICINE

## 2018-05-22 PROCEDURE — 25010000002 HYDRALAZINE PER 20 MG: Performed by: INTERNAL MEDICINE

## 2018-05-22 PROCEDURE — 87798 DETECT AGENT NOS DNA AMP: CPT | Performed by: INTERNAL MEDICINE

## 2018-05-22 PROCEDURE — 87581 M.PNEUMON DNA AMP PROBE: CPT | Performed by: INTERNAL MEDICINE

## 2018-05-22 PROCEDURE — 83605 ASSAY OF LACTIC ACID: CPT | Performed by: INTERNAL MEDICINE

## 2018-05-22 PROCEDURE — 87081 CULTURE SCREEN ONLY: CPT | Performed by: INTERNAL MEDICINE

## 2018-05-22 PROCEDURE — 85027 COMPLETE CBC AUTOMATED: CPT | Performed by: INTERNAL MEDICINE

## 2018-05-22 PROCEDURE — 94799 UNLISTED PULMONARY SVC/PX: CPT

## 2018-05-22 PROCEDURE — 87633 RESP VIRUS 12-25 TARGETS: CPT | Performed by: INTERNAL MEDICINE

## 2018-05-22 PROCEDURE — 25010000002 PIPERACILLIN SOD-TAZOBACTAM PER 1 G: Performed by: INTERNAL MEDICINE

## 2018-05-22 PROCEDURE — 87486 CHLMYD PNEUM DNA AMP PROBE: CPT | Performed by: INTERNAL MEDICINE

## 2018-05-22 PROCEDURE — 99223 1ST HOSP IP/OBS HIGH 75: CPT | Performed by: INTERNAL MEDICINE

## 2018-05-22 PROCEDURE — 83735 ASSAY OF MAGNESIUM: CPT | Performed by: INTERNAL MEDICINE

## 2018-05-22 PROCEDURE — 25010000002 CEFEPIME PER 500 MG: Performed by: EMERGENCY MEDICINE

## 2018-05-22 PROCEDURE — 80164 ASSAY DIPROPYLACETIC ACD TOT: CPT | Performed by: INTERNAL MEDICINE

## 2018-05-22 PROCEDURE — 25010000002 VANCOMYCIN 10 G RECONSTITUTED SOLUTION: Performed by: INTERNAL MEDICINE

## 2018-05-22 PROCEDURE — 25010000002 THIAMINE PER 100 MG: Performed by: PSYCHIATRY & NEUROLOGY

## 2018-05-22 PROCEDURE — 99232 SBSQ HOSP IP/OBS MODERATE 35: CPT | Performed by: PSYCHIATRY & NEUROLOGY

## 2018-05-22 PROCEDURE — 25010000003 POTASSIUM CHLORIDE 10 MEQ/100ML SOLUTION: Performed by: INTERNAL MEDICINE

## 2018-05-22 PROCEDURE — 87147 CULTURE TYPE IMMUNOLOGIC: CPT | Performed by: EMERGENCY MEDICINE

## 2018-05-22 PROCEDURE — 99254 IP/OBS CNSLTJ NEW/EST MOD 60: CPT | Performed by: INTERNAL MEDICINE

## 2018-05-22 PROCEDURE — 25010000002 LORAZEPAM PER 2 MG: Performed by: EMERGENCY MEDICINE

## 2018-05-22 RX ORDER — POTASSIUM CHLORIDE 7.45 MG/ML
10 INJECTION INTRAVENOUS
Status: DISCONTINUED | OUTPATIENT
Start: 2018-05-22 | End: 2018-05-25 | Stop reason: HOSPADM

## 2018-05-22 RX ORDER — POTASSIUM CHLORIDE 1.5 G/1.77G
40 POWDER, FOR SOLUTION ORAL AS NEEDED
Status: DISCONTINUED | OUTPATIENT
Start: 2018-05-22 | End: 2018-05-25 | Stop reason: HOSPADM

## 2018-05-22 RX ORDER — DEXMEDETOMIDINE HYDROCHLORIDE 4 UG/ML
.2-1.5 INJECTION, SOLUTION INTRAVENOUS
Status: DISCONTINUED | OUTPATIENT
Start: 2018-05-22 | End: 2018-05-23

## 2018-05-22 RX ORDER — RISPERIDONE 1 MG/1
2 TABLET ORAL DAILY
Status: DISCONTINUED | OUTPATIENT
Start: 2018-05-22 | End: 2018-05-23

## 2018-05-22 RX ORDER — SODIUM CHLORIDE 0.9 % (FLUSH) 0.9 %
1-10 SYRINGE (ML) INJECTION AS NEEDED
Status: DISCONTINUED | OUTPATIENT
Start: 2018-05-22 | End: 2018-05-25 | Stop reason: HOSPADM

## 2018-05-22 RX ORDER — ACETAMINOPHEN 325 MG/1
650 TABLET ORAL EVERY 4 HOURS PRN
Status: DISCONTINUED | OUTPATIENT
Start: 2018-05-22 | End: 2018-05-25 | Stop reason: HOSPADM

## 2018-05-22 RX ORDER — ONDANSETRON 4 MG/1
4 TABLET, ORALLY DISINTEGRATING ORAL EVERY 6 HOURS PRN
Status: DISCONTINUED | OUTPATIENT
Start: 2018-05-22 | End: 2018-05-25 | Stop reason: HOSPADM

## 2018-05-22 RX ORDER — DEXTROSE MONOHYDRATE 25 G/50ML
25 INJECTION, SOLUTION INTRAVENOUS
Status: DISCONTINUED | OUTPATIENT
Start: 2018-05-22 | End: 2018-05-25 | Stop reason: HOSPADM

## 2018-05-22 RX ORDER — HYDROXYZINE HYDROCHLORIDE 25 MG/1
25 TABLET, FILM COATED ORAL 3 TIMES DAILY PRN
Status: DISCONTINUED | OUTPATIENT
Start: 2018-05-22 | End: 2018-05-25 | Stop reason: HOSPADM

## 2018-05-22 RX ORDER — IPRATROPIUM BROMIDE AND ALBUTEROL SULFATE 2.5; .5 MG/3ML; MG/3ML
3 SOLUTION RESPIRATORY (INHALATION) EVERY 6 HOURS PRN
Status: DISCONTINUED | OUTPATIENT
Start: 2018-05-22 | End: 2018-05-25 | Stop reason: HOSPADM

## 2018-05-22 RX ORDER — LANOLIN ALCOHOL/MO/W.PET/CERES
400 CREAM (GRAM) TOPICAL DAILY
Status: DISCONTINUED | OUTPATIENT
Start: 2018-05-22 | End: 2018-05-25 | Stop reason: HOSPADM

## 2018-05-22 RX ORDER — NICOTINE POLACRILEX 4 MG
15 LOZENGE BUCCAL
Status: DISCONTINUED | OUTPATIENT
Start: 2018-05-22 | End: 2018-05-25 | Stop reason: HOSPADM

## 2018-05-22 RX ORDER — POTASSIUM CHLORIDE 750 MG/1
40 CAPSULE, EXTENDED RELEASE ORAL AS NEEDED
Status: DISCONTINUED | OUTPATIENT
Start: 2018-05-22 | End: 2018-05-25 | Stop reason: HOSPADM

## 2018-05-22 RX ORDER — MAGNESIUM SULFATE HEPTAHYDRATE 40 MG/ML
2 INJECTION, SOLUTION INTRAVENOUS AS NEEDED
Status: DISCONTINUED | OUTPATIENT
Start: 2018-05-22 | End: 2018-05-23

## 2018-05-22 RX ORDER — FLUOXETINE HYDROCHLORIDE 20 MG/1
80 CAPSULE ORAL DAILY
Status: DISCONTINUED | OUTPATIENT
Start: 2018-05-22 | End: 2018-05-22

## 2018-05-22 RX ORDER — ONDANSETRON 4 MG/1
4 TABLET, FILM COATED ORAL EVERY 6 HOURS PRN
Status: DISCONTINUED | OUTPATIENT
Start: 2018-05-22 | End: 2018-05-25 | Stop reason: HOSPADM

## 2018-05-22 RX ORDER — BISACODYL 5 MG/1
5 TABLET, DELAYED RELEASE ORAL DAILY PRN
Status: DISCONTINUED | OUTPATIENT
Start: 2018-05-22 | End: 2018-05-25 | Stop reason: HOSPADM

## 2018-05-22 RX ORDER — LORAZEPAM 2 MG/ML
1 INJECTION INTRAMUSCULAR EVERY 4 HOURS PRN
Status: DISCONTINUED | OUTPATIENT
Start: 2018-05-22 | End: 2018-05-23

## 2018-05-22 RX ORDER — ACETAMINOPHEN 650 MG/1
650 SUPPOSITORY RECTAL EVERY 4 HOURS PRN
Status: DISCONTINUED | OUTPATIENT
Start: 2018-05-22 | End: 2018-05-25 | Stop reason: HOSPADM

## 2018-05-22 RX ORDER — METOPROLOL TARTRATE 50 MG/1
50 TABLET, FILM COATED ORAL DAILY
Status: DISCONTINUED | OUTPATIENT
Start: 2018-05-22 | End: 2018-05-23

## 2018-05-22 RX ORDER — ROSUVASTATIN CALCIUM 10 MG/1
10 TABLET, COATED ORAL DAILY
Status: DISCONTINUED | OUTPATIENT
Start: 2018-05-22 | End: 2018-05-25 | Stop reason: HOSPADM

## 2018-05-22 RX ORDER — BISACODYL 10 MG
10 SUPPOSITORY, RECTAL RECTAL DAILY PRN
Status: DISCONTINUED | OUTPATIENT
Start: 2018-05-22 | End: 2018-05-25 | Stop reason: HOSPADM

## 2018-05-22 RX ORDER — LORAZEPAM 2 MG/ML
1 INJECTION INTRAMUSCULAR ONCE
Status: COMPLETED | OUTPATIENT
Start: 2018-05-22 | End: 2018-05-22

## 2018-05-22 RX ORDER — MAGNESIUM SULFATE HEPTAHYDRATE 40 MG/ML
4 INJECTION, SOLUTION INTRAVENOUS AS NEEDED
Status: DISCONTINUED | OUTPATIENT
Start: 2018-05-22 | End: 2018-05-23

## 2018-05-22 RX ORDER — ONDANSETRON 2 MG/ML
4 INJECTION INTRAMUSCULAR; INTRAVENOUS EVERY 6 HOURS PRN
Status: DISCONTINUED | OUTPATIENT
Start: 2018-05-22 | End: 2018-05-25 | Stop reason: HOSPADM

## 2018-05-22 RX ORDER — GABAPENTIN 300 MG/1
300 CAPSULE ORAL EVERY 8 HOURS SCHEDULED
Status: DISCONTINUED | OUTPATIENT
Start: 2018-05-22 | End: 2018-05-22

## 2018-05-22 RX ORDER — HYDRALAZINE HYDROCHLORIDE 20 MG/ML
20 INJECTION INTRAMUSCULAR; INTRAVENOUS EVERY 4 HOURS PRN
Status: DISCONTINUED | OUTPATIENT
Start: 2018-05-22 | End: 2018-05-25 | Stop reason: HOSPADM

## 2018-05-22 RX ORDER — FLUOXETINE HYDROCHLORIDE 20 MG/1
40 CAPSULE ORAL DAILY
Status: DISCONTINUED | OUTPATIENT
Start: 2018-05-23 | End: 2018-05-22

## 2018-05-22 RX ORDER — SODIUM CHLORIDE 9 MG/ML
9 INJECTION, SOLUTION INTRAVENOUS CONTINUOUS
Status: DISCONTINUED | OUTPATIENT
Start: 2018-05-22 | End: 2018-05-23

## 2018-05-22 RX ORDER — SODIUM CHLORIDE, SODIUM LACTATE, POTASSIUM CHLORIDE, CALCIUM CHLORIDE 600; 310; 30; 20 MG/100ML; MG/100ML; MG/100ML; MG/100ML
125 INJECTION, SOLUTION INTRAVENOUS CONTINUOUS
Status: DISCONTINUED | OUTPATIENT
Start: 2018-05-22 | End: 2018-05-23

## 2018-05-22 RX ORDER — LISINOPRIL 40 MG/1
40 TABLET ORAL DAILY
Status: DISCONTINUED | OUTPATIENT
Start: 2018-05-22 | End: 2018-05-25 | Stop reason: HOSPADM

## 2018-05-22 RX ADMIN — TAZOBACTAM SODIUM AND PIPERACILLIN SODIUM 3.38 G: 375; 3 INJECTION, SOLUTION INTRAVENOUS at 21:31

## 2018-05-22 RX ADMIN — VALPROATE SODIUM 500 MG: 100 INJECTION, SOLUTION INTRAVENOUS at 17:51

## 2018-05-22 RX ADMIN — POTASSIUM CHLORIDE 10 MEQ: 10 INJECTION, SOLUTION INTRAVENOUS at 17:45

## 2018-05-22 RX ADMIN — LORAZEPAM 1 MG: 2 INJECTION INTRAMUSCULAR; INTRAVENOUS at 01:14

## 2018-05-22 RX ADMIN — VANCOMYCIN HYDROCHLORIDE 1500 MG: 10 INJECTION, POWDER, LYOPHILIZED, FOR SOLUTION INTRAVENOUS at 01:01

## 2018-05-22 RX ADMIN — METOPROLOL TARTRATE 5 MG: 5 INJECTION, SOLUTION INTRAVENOUS at 15:00

## 2018-05-22 RX ADMIN — POTASSIUM CHLORIDE 10 MEQ: 10 INJECTION, SOLUTION INTRAVENOUS at 18:45

## 2018-05-22 RX ADMIN — ACETAMINOPHEN 650 MG: 650 SUPPOSITORY RECTAL at 03:40

## 2018-05-22 RX ADMIN — DEXMEDETOMIDINE HYDROCHLORIDE 0.4 MCG/KG/HR: 100 INJECTION, SOLUTION INTRAVENOUS at 01:57

## 2018-05-22 RX ADMIN — METOPROLOL TARTRATE 5 MG: 5 INJECTION, SOLUTION INTRAVENOUS at 20:01

## 2018-05-22 RX ADMIN — SODIUM CHLORIDE, POTASSIUM CHLORIDE, SODIUM LACTATE AND CALCIUM CHLORIDE 500 ML: 600; 310; 30; 20 INJECTION, SOLUTION INTRAVENOUS at 15:03

## 2018-05-22 RX ADMIN — IOPAMIDOL 95 ML: 755 INJECTION, SOLUTION INTRAVENOUS at 06:03

## 2018-05-22 RX ADMIN — VANCOMYCIN HYDROCHLORIDE 1250 MG: 10 INJECTION, POWDER, LYOPHILIZED, FOR SOLUTION INTRAVENOUS at 12:18

## 2018-05-22 RX ADMIN — METOPROLOL TARTRATE 5 MG: 5 INJECTION, SOLUTION INTRAVENOUS at 02:44

## 2018-05-22 RX ADMIN — TAZOBACTAM SODIUM AND PIPERACILLIN SODIUM 3.38 G: 375; 3 INJECTION, SOLUTION INTRAVENOUS at 12:17

## 2018-05-22 RX ADMIN — Medication 20 MG: at 04:09

## 2018-05-22 RX ADMIN — THIAMINE HYDROCHLORIDE 100 MG: 100 INJECTION, SOLUTION INTRAMUSCULAR; INTRAVENOUS at 18:30

## 2018-05-22 RX ADMIN — VALPROATE SODIUM 500 MG: 100 INJECTION, SOLUTION INTRAVENOUS at 03:56

## 2018-05-22 RX ADMIN — POTASSIUM CHLORIDE 10 MEQ: 10 INJECTION, SOLUTION INTRAVENOUS at 15:04

## 2018-05-22 RX ADMIN — SODIUM CHLORIDE 9 ML/HR: 9 INJECTION, SOLUTION INTRAVENOUS at 04:00

## 2018-05-22 RX ADMIN — POTASSIUM CHLORIDE 10 MEQ: 10 INJECTION, SOLUTION INTRAVENOUS at 16:10

## 2018-05-22 RX ADMIN — CEFEPIME HYDROCHLORIDE 2 G: 2 INJECTION, POWDER, FOR SOLUTION INTRAVENOUS at 00:57

## 2018-05-22 NOTE — PROGRESS NOTES
Discharge Planning Assessment   [facility]     Patient Name: Abdelrahman Murray  MRN: [id number]  Today's Date: 5/22/2018    Admit Date: 5/21/2018          Discharge Needs Assessment     Row Name 05/22/18 1732       Living Environment    Lives With spouse    Current Living Arrangements home/apartment/condo    Primary Care Provided by self    Provides Primary Care For no one    Family Caregiver if Needed spouse    Quality of Family Relationships supportive    Able to Return to Prior Arrangements yes       Resource/Environmental Concerns    Resource/Environmental Concerns none    Transportation Concerns car, none       Transition Planning    Patient/Family Anticipates Transition to home with family    Patient/Family Anticipated Services at Transition none    Transportation Anticipated family or friend will provide       Discharge Needs Assessment    Readmission Within the Last 30 Days no previous admission in last 30 days    Concerns to be Addressed discharge planning    Equipment Currently Used at Home none    Anticipated Changes Related to Illness none    Equipment Needed After Discharge none    Outpatient/Agency/Support Group Needs outpatient substance abuse treatment   per pt's wife he has been in treatment at [facility] and [facility], both inpatient for Drug and ETOH    Offered/Gave Vendor List yes    Current Discharge Risk dependent with mobility/activities of daily living            Discharge Plan     Row Name 05/22/18 1733       Plan    Plan Undetermined    Patient/Family in Agreement with Plan yes    Plan Comments Spoke with pt's wife Allegra by phone.  Introduced self and explained role.  CCP contact information provided.  Face sheet and pharmacy verified.  Pt is independent at home and uses no DME.  According to his wife, he has been to outpt physical therapy after hand surgery.  He has been to [facility] and [facility] inpatient in the past for drug and ETOH treatment.  He has no hx of  or SNF.  Needs are not yet  known due to pt condition.  CCP will follow.        Destination     No service coordination in this encounter.      Durable Medical Equipment     No service coordination in this encounter.      Dialysis/Infusion     No service coordination in this encounter.      Home Medical Care     No service coordination in this encounter.      Social Care     No service coordination in this encounter.                Demographic Summary     Row Name 05/22/18 0930       General Information    Admission Type inpatient    Arrived From home    Referral Source admission list    Reason for Consult discharge planning    Preferred Language English     Used During This Interaction no            Functional Status     Row Name 05/22/18 1441       Functional Status    Usual Activity Tolerance good    Current Activity Tolerance moderate       Functional Status, IADL    Medications independent    Meal Preparation independent    Housekeeping independent    Laundry independent    Shopping independent       Mental Status    General Appearance WDL ex       Mental Status Summary    Recent Changes in Mental Status/Cognitive Functioning mental status       Employment/    Employment Status self-employed            Psychosocial    No documentation.           Abuse/Neglect    No documentation.           Legal    No documentation.           Substance Abuse    No documentation.           Patient Forms    No documentation.         Leonora Nieves RN

## 2018-05-22 NOTE — PROGRESS NOTES
"Pharmacokinetic Consult - Vancomycin Dosing (Initial Note)    Abdelrahman Murray has been consulted for pharmacy to dose vancomycin for sepsis secondary to RLL PNA and possible CNS involvement per Dr. Lane's request. Goal trough: 15-20 mcg/mL.    Duration of Therapy: 10 days    Other Antimicrobials: Cefepime 2g IV q8h EI    Relevant clinical data and objective history reviewed:  58 y.o. male 170.2 cm (67\") 74.7 kg (164 lb 10.9 oz)  Patient presents to the hospital with fever/chills, SOA,  confusion, and agitation. He has a PMH of mantle cell lymphoma, but has had no treatment in the past year, and alcohol abuse. CXR on 5/21 showed right lung infiltrates. Physician will consider lumbar puncture if patient's mental status does not improve.     Creatinine   Date Value Ref Range Status   05/21/2018 1.14 0.76 - 1.27 mg/dL Final     BUN   Date Value Ref Range Status   05/21/2018 13 6 - 20 mg/dL Final     Estimated Creatinine Clearance: 76.4 mL/min (by C-G formula based on SCr of 1.14 mg/dL).    Lab Results   Component Value Date    WBC 14.50 (H) 05/21/2018     Temp Readings from Last 3 Encounters:   05/22/18 (!) 104.6 °F (40.3 °C) (Tympanic)      Baseline culture/source/susceptibility:   5/21: Blood cultures x 2-in process    Assessment/Plan    1. Patient received a vancomycin loading dose of 1500 mg (20 mg/kg) IV once in the ED around 0100. Will start a maintenance dose of 1250 mg (~17 mg/kg) IV q12h at 1300 today based on patient parameters.     2. Will schedule a vancomycin trough for Wednesday afternoon 5/23 before the 1300 dose (before the 4th total dose).    3. Will monitor serum creatinine every 24 hours since the patient is on both vancomycin and cefepime. SCr was a little elevated from his baseline upon admission-1.14.    4. Pharmacy will continue to follow daily while on vancomycin and adjust as needed.     Thank you for allowing me to participate in your patient's care,  Nila Pastrana, Pharm.D., BCPS  "

## 2018-05-22 NOTE — CONSULTS
Subjective     REASON FOR CONSULTATION: Patient with history of mantle cell lymphoma-no longer followed in our office since 5/17     Provide an opinion on any further workup or treatment                             REQUESTING PHYSICIAN:  Aiden Marroquin M.D.    RECORDS OBTAINED:  Records of the patients history including those obtained from the referring provider were reviewed and summarized in detail.    HISTORY OF PRESENT ILLNESS:  The patient is a 58 y.o. year old male who is here for an opinion about the above issue.    History of Present Illness patient is a 58-year-old male with long history of mantle cell lymphoma was treated in our office until May 2017 when he was lost to follow-up.  He was referred to [facility] and was seen last in there sometime last year but because   SUBSTANCE abuse was not felt to be a candidate for therapy there .  He has been following with his family doctor was last seen in the office earlier this month after recent discharge from [facility] for substance abuse detoxification.  He is admitted now with altered mental status was yelling and screaming earlier and now is unresponsive.  CAT scans done in the ER showed evidence of bilateral pneumonia more on the right than the left and also very small lymph nodes in the mediastinum and supraclavicular areas with minimal progression since his last CAT scans in April 2017.HEAD CT negtive    Currently there is no family at bedside to give any history    Past Medical History:   Diagnosis Date   • Alcohol abuse    • Cancer     H/O Mantle cell lymphoma   • Clotting disorder     DVT left internal jugular vein port associated   • Diabetes mellitus    • GERD (gastroesophageal reflux disease)    • H/O Alcohol-induced pancreatitis    • H/O Ankle fracture 2009   • H/O Peripheral neuropathy     when on vincristine and Velcade   • Hypertension    • Lymphoma         Past Surgical History:   Procedure Laterality Date   • CARPAL  TUNNEL RELEASE  1999   • COLONOSCOPY     • ENDOSCOPY AND COLONOSCOPY N/A 3/9/2016    Procedure: ESOPHAGOGASTRODUODENOSCOPY  WITH BX AND COLONOSCOPY TO CECUM/TI WITH BX POLYPECTOMY (COLD SNARE/BX);  Surgeon: Peter Flaherty MD;  Location: [facility] ENDOSCOPY;  Service:    • PILONIDAL CYST / SINUS EXCISION  01/29/2010        No current facility-administered medications on file prior to encounter.      Current Outpatient Prescriptions on File Prior to Encounter   Medication Sig Dispense Refill   • B Complex Vitamins (VITAMIN B COMPLEX) capsule capsule      • divalproex (DEPAKOTE) 500 MG DR tablet      • FLUoxetine (PROZAC) 40 MG capsule Take 2 capsules by mouth Daily. 180 capsule 3   • folic acid (FOLVITE) 1 MG tablet      • gabapentin (NEURONTIN) 300 MG capsule TAKE 1 CAPSULE THREE TIMES DAILY 270 capsule 1   • hydrOXYzine (ATARAX) 25 MG tablet TAKE 1 TABLET THREE TIMES DAILY AS NEEDED FOR ANXIETY 90 tablet 1   • lisinopril (PRINIVIL,ZESTRIL) 40 MG tablet TAKE 1 TABLET EVERY DAY 90 tablet 0   • metFORMIN (GLUCOPHAGE) 500 MG tablet Take 1 tablet by mouth Daily. 90 tablet 1   • metoprolol tartrate (LOPRESSOR) 50 MG tablet Take 50 mg by mouth.     • montelukast (SINGULAIR) 10 MG tablet Take 1 tablet by mouth Every Night. 90 tablet 0   • omeprazole (priLOSEC) 20 MG capsule Take 1 capsule by mouth Daily. 90 capsule 0   • risperiDONE (risperDAL) 2 MG tablet      • rosuvastatin (CRESTOR) 10 MG tablet Take 10 mg by mouth Daily.     • acetaminophen (TYLENOL) 500 MG tablet Take 1 tablet by mouth Every 6 (Six) Hours As Needed for Mild Pain (1-3). 60 tablet 1   • acetaminophen-codeine (TYLENOL #3) 300-30 MG per tablet Take 1 tablet by mouth Daily As Needed for Moderate Pain . 30 tablet 0   • baclofen (LIORESAL) 10 MG tablet As Needed.     • metoprolol tartrate (LOPRESSOR) 50 MG tablet TAKE 1 TABLET EVERY DAY 90 tablet 0   • metoprolol tartrate (LOPRESSOR) 50 MG tablet TAKE 1 TABLET EVERY DAY 90 tablet 0        ALLERGIES:     Allergies   Allergen Reactions   • Adult Aspirin Ec [Aspirin] GI Intolerance     Intolerance-causes stomach irritation   • Crestor [Rosuvastatin] Diarrhea and GI Intolerance        Social History     Social History   • Marital status:      Spouse name: Allegra     Occupational History   •  Self-Employed     Social History Main Topics   • Smoking status: Current Every Day Smoker     Packs/day: 1.00     Years: 45.00     Types: Cigarettes     Start date: 2/12/1977     Last attempt to quit: 12/27/2012   • Alcohol use 10.8 oz/week     18 Cans of beer per week      Comment: Stopped APRIL 2018   • Drug use: Yes     Types: Marijuana   • Sexual activity: Defer     Other Topics Concern   • Not on file        Family History   Problem Relation Age of Onset   • Lung cancer Father         Lung with brain metastases   • No Known Problems Sister    • COPD Mother         smoker   • No Known Problems Brother    • No Known Problems Maternal Grandmother    • No Known Problems Maternal Grandfather    • Uterine cancer Paternal Grandmother    • No Known Problems Paternal Grandfather    • Lung cancer Other    • No Known Problems Sister    • No Known Problems Sister    • Diabetes Paternal Uncle    • Bone cancer Paternal Aunt         Review of Systems Not obtained as the patient is obtunded     Objective     Vitals:    05/22/18 0535 05/22/18 0640 05/22/18 0705 05/22/18 0920   BP: 146/67 151/78 161/76    Pulse: 89 58 89 117   Resp:       Temp:   98.6 °F (37 °C)    TempSrc:       SpO2: 97% 96% 95% 94%   Weight:       Height:         Current Status 5/3/2017   ECOG score 1       Physical Exam    GENERAL:  Well-developed, well-nourished in no acute distress. He is in Nonverbal and nonresponsive  SKIN:  Warm, dry without rashes, purpura or petechiae.  EYES:  Pupils equal, round and small  EOMs intact.  Conjunctivae normal.  NOSE:  Septum midline.  No excoriations or nasal discharge.  MOUTH:  Tongue is well-papillated; no stomatitis or  ulcers.  Lips normal.  NECK:  Supple with good range of motion; no thyromegaly or masses, no JVD.  LYMPHATICS:  No cervical, supraclavicular, axillary or inguinal adenopathy.  CHEST:  Lung bilateral course rales with bronchial breath sounds right base  CARDIAC:  Regular rate and rhythm without murmurs, rubs or gallops. Normal S1,S2.  ABDOMEN:  Soft, nontender with no hepatosplenomegaly or masses.  EXTREMITIES:  No clubbing, cyanosis or edema.  NEUROLOGICAL:  Cranial Nerves II-XII grossly intact.  No focal neurological deficits.         RECENT LABS:  Hematology WBC   Date Value Ref Range Status   05/22/2018 15.29 (H) 4.50 - 10.70 10*3/mm3 Final     RBC   Date Value Ref Range Status   05/22/2018 3.92 (L) 4.60 - 6.00 10*6/mm3 Final     Hemoglobin   Date Value Ref Range Status   05/22/2018 12.0 (L) 13.7 - 17.6 g/dL Final     Hematocrit   Date Value Ref Range Status   05/22/2018 37.5 (L) 40.4 - 52.2 % Final     Platelets   Date Value Ref Range Status   05/22/2018 162 140 - 500 10*3/mm3 Final         CT CAP  IMPRESSION:  1. Dense right lower lobe consolidation consistent with infiltrate with  moderate right and small left pleural effusions. There is also  consolidation to a lesser degree within the posterior right upper lobe,  right lower lobe and left lower lobe. Mild pulmonary emphysema.  2. No evidence for pulmonary thromboembolic disease though evaluation of  the distal segmental and subsegmental branches is limited due to  motion-related artifact.  3. 1.6 cm left supraclavicular fossa node is slightly increased from 1.5  cm on the prior exam of 04/13/2017. There has also been mild increase in  size of precarinal lymph node.  4. No evidence for acute abnormality in the abdomen or pelvis or  significant change when compared to prior CT abdomen and pelvis  04/13/2017 allowing for motion limitation.       Assessment/Plan   1 history of mantle cell lymphoma no longer and active therapy for his records last seen in  [place] in 2017 last seen in our office in May 2017-CAT scans in the ER show very minimal progression of nodes in the mediastinum and supraclavicular area since April 2017    2.  History of substance abuse admitted with mental status change and obtundation?  Drug overdose-mantle cell lymphoma has a predilection for the CNS.  I suspect this is not the case-?depakote toxicity     .3.  History of DVT in the past port related     4. Type II DM      PLAN  1.agree with Abx and tox screen   2.check ammonia level and depakote level    Will follow  Not a candidate for Rx of NHL unless substance abuse is addressed

## 2018-05-22 NOTE — NURSING NOTE
Monitor tech asked me to go check the patient as his oxygen saturation was low and his RN was off floor. Found pt to be somnolent, arousing to sternal rub. Oxygen increased and precedex stopped. RN and MD notified.    Denice Zavaleta RN

## 2018-05-22 NOTE — ED PROVIDER NOTES
" EMERGENCY DEPARTMENT ENCOUNTER    CHIEF COMPLAINT  Chief Complaint: SOA  History given by: patient and EMS  History limited by: nothing  Room Number: 13/13  PMD: Vane Rodrigez Giovana, APRN      HPI:  Pt is a 58 y.o. male with hx of lung cancer who presents complaining of SOA since this afternoon. EMS reports pt was confused and 70% on RA upon arrival. Pt is receiving treatment for his CA with the last treatment being \"a couple weeks ago\".    Duration:  Since this afternoon  Onset: gradual  Timing: constant  Quality: SOA  Intensity/Severity: moderate  Progression: unchanged  Associated Symptoms: confused  Aggravating Factors: none  Alleviating Factors: none  Previous Episodes: Pt has hx of lung cancer.  Treatment before arrival: Pt reports no treatment PTA.    PAST MEDICAL HISTORY  Active Ambulatory Problems     Diagnosis Date Noted   • Mantle cell lymphoma of lymph nodes of multiple sites 02/12/2016   • Hypogammaglobulinemia 02/26/2016   • Tobacco abuse 05/23/2017   • Hypertension 05/23/2017   • Diabetes mellitus 05/23/2017   • Anxiety and depression 08/30/2017   • Gastroesophageal reflux disease 08/30/2017   • Thyroid nodule 02/26/2018     Resolved Ambulatory Problems     Diagnosis Date Noted   • No Resolved Ambulatory Problems     Past Medical History:   Diagnosis Date   • Cancer    • Clotting disorder    • Diabetes mellitus    • GERD (gastroesophageal reflux disease)    • H/O Alcohol-induced pancreatitis    • H/O Ankle fracture 2009   • H/O Peripheral neuropathy    • Hypertension    • Lymphoma        PAST SURGICAL HISTORY  Past Surgical History:   Procedure Laterality Date   • CARPAL TUNNEL RELEASE  1999   • COLONOSCOPY     • ENDOSCOPY AND COLONOSCOPY N/A 3/9/2016    Procedure: ESOPHAGOGASTRODUODENOSCOPY  WITH BX AND COLONOSCOPY TO CECUM/TI WITH BX POLYPECTOMY (COLD SNARE/BX);  Surgeon: Peter Flaherty MD;  Location: [facility] ENDOSCOPY;  Service:    • PILONIDAL CYST / SINUS EXCISION  01/29/2010       FAMILY " HISTORY  Family History   Problem Relation Age of Onset   • Lung cancer Father         Lung with brain metastases   • No Known Problems Sister    • COPD Mother         smoker   • No Known Problems Brother    • No Known Problems Maternal Grandmother    • No Known Problems Maternal Grandfather    • Uterine cancer Paternal Grandmother    • No Known Problems Paternal Grandfather    • Lung cancer Other    • No Known Problems Sister    • No Known Problems Sister    • Diabetes Paternal Uncle    • Bone cancer Paternal Aunt        SOCIAL HISTORY  Social History     Social History   • Marital status:      Spouse name: Allegra   • Number of children: N/A   • Years of education: N/A     Occupational History   •  Self-Employed     Social History Main Topics   • Smoking status: Former Smoker     Packs/day: 1.00     Years: 38.00     Types: Cigarettes     Start date: 2/12/1977     Quit date: 12/27/2012   • Smokeless tobacco: Not on file   • Alcohol use 7.2 oz/week     12 Cans of beer per week      Comment: Stopped 12-   • Drug use: Yes     Types: Marijuana   • Sexual activity: Defer     Other Topics Concern   • Not on file     Social History Narrative   • No narrative on file       ALLERGIES  Adult aspirin ec [aspirin] and Crestor [rosuvastatin]    REVIEW OF SYSTEMS  Review of Systems   Constitutional: Negative for activity change, appetite change and fever.   HENT: Negative for congestion and sore throat.    Respiratory: Positive for shortness of breath. Negative for cough.    Cardiovascular: Negative for chest pain and leg swelling.   Gastrointestinal: Negative for abdominal pain, diarrhea and vomiting.   Genitourinary: Negative for decreased urine volume and dysuria.   Musculoskeletal: Negative for neck pain.   Skin: Negative for rash and wound.   Neurological: Negative for weakness, numbness and headaches.   All other systems reviewed and are negative.      PHYSICAL EXAM  ED Triage Vitals [05/21/18 2308]   Temp  Heart Rate Resp BP SpO2   98.9 °F (37.2 °C) 119 (!) 40 (!) 190/100 92 %      Temp src Heart Rate Source Patient Position BP Location FiO2 (%)   Tympanic -- -- -- --       Physical Exam   Constitutional: He is oriented to person, place, and time and well-developed, well-nourished, and in no distress.   HENT:   Head: Normocephalic and atraumatic.   Normal MM   Eyes: EOM are normal. Pupils are equal, round, and reactive to light.   Neck: Normal range of motion. Neck supple.   Cardiovascular: Regular rhythm and normal heart sounds.  Tachycardia present.    Pulmonary/Chest: Effort normal and breath sounds normal. Tachypnea noted. No respiratory distress.   Pt is taking shallow, small breaths   Abdominal: Soft. There is no tenderness. There is no rebound and no guarding.   Musculoskeletal: Normal range of motion. He exhibits no edema.   Neurological: He is alert and oriented to person, place, and time. He has normal sensation and normal strength.   Skin: Skin is warm and dry.   Psychiatric: Mood normal. He is agitated.   Nursing note and vitals reviewed.      LAB RESULTS  Lab Results (last 24 hours)     Procedure Component Value Units Date/Time    CBC & Differential [[id number]] Collected:  05/21/18 2330    Specimen:  Blood Updated:  05/21/18 2339    Narrative:       The following orders were created for panel order CBC & Differential.  Procedure                               Abnormality         Status                     ---------                               -----------         ------                     CBC Auto Differential[[id number]]        Abnormal            Final result                 Please view results for these tests on the individual orders.    Comprehensive Metabolic Panel [[id number]]  (Abnormal) Collected:  05/21/18 2330    Specimen:  Blood Updated:  05/22/18 0001     Glucose 199 (H) mg/dL      BUN 13 mg/dL      Creatinine 1.14 mg/dL      Sodium 137 mmol/L      Potassium 3.4 (L) mmol/L      Chloride 98  mmol/L      CO2 22.4 mmol/L      Calcium 9.1 mg/dL      Total Protein 6.4 g/dL      Albumin 3.60 g/dL      ALT (SGPT) 9 U/L      AST (SGOT) 11 U/L      Alkaline Phosphatase 92 U/L      Total Bilirubin 0.5 mg/dL      eGFR Non African Amer 66 mL/min/1.73      Globulin 2.8 gm/dL      A/G Ratio 1.3 g/dL      BUN/Creatinine Ratio 11.4     Anion Gap 16.6 mmol/L     Lactic Acid, Plasma [[id number]]  (Abnormal) Collected:  05/21/18 2330    Specimen:  Blood Updated:  05/22/18 0000     Lactate 2.2 (C) mmol/L     Blood Culture - Blood, [[id number]] Collected:  05/21/18 2330    Specimen:  Blood from Arm, Left Updated:  05/21/18 2333    CBC Auto Differential [[id number]]  (Abnormal) Collected:  05/21/18 2330    Specimen:  Blood Updated:  05/21/18 2339     WBC 14.50 (H) 10*3/mm3      RBC 4.24 (L) 10*6/mm3      Hemoglobin 13.1 (L) g/dL      Hematocrit 39.7 (L) %      MCV 93.6 fL      MCH 30.9 pg      MCHC 33.0 g/dL      RDW 13.3 %      RDW-SD 45.5 fl      MPV 10.2 fL      Platelets 207 10*3/mm3      Neutrophil % 92.8 (H) %      Lymphocyte % 4.0 (L) %      Monocyte % 2.8 (L) %      Eosinophil % 0.1 (L) %      Basophil % 0.1 %      Immature Grans % 0.2 %      Neutrophils, Absolute 13.47 (H) 10*3/mm3      Lymphocytes, Absolute 0.58 (L) 10*3/mm3      Monocytes, Absolute 0.40 10*3/mm3      Eosinophils, Absolute 0.01 10*3/mm3      Basophils, Absolute 0.01 10*3/mm3      Immature Grans, Absolute 0.03 10*3/mm3     Ethanol [[id number]] Collected:  05/21/18 2330    Specimen:  Blood Updated:  05/21/18 2354    Magnesium [[id number]] Collected:  05/21/18 2330    Specimen:  Blood Updated:  05/21/18 2354    Procalcitonin [[id number]] Collected:  05/21/18 2330    Specimen:  Blood Updated:  05/21/18 2354    Protime-INR [[id number]]  (Abnormal) Collected:  05/21/18 2330    Specimen:  Blood Updated:  05/22/18 0000     Protime 15.2 (H) Seconds      INR 1.22 (H)    Troponin [[id number]] Collected:  05/21/18 2330    Specimen:  Blood Updated:  05/21/18 2354     D-dimer, Quantitative [[id number]]  (Abnormal) Collected:  05/21/18 2330    Specimen:  Blood Updated:  05/22/18 0000     D-Dimer, Quantitative 1.73 (H) MCGFEU/mL     Narrative:       The Stago D-Dimer test used in conjunction with a clinical pretest probability (PTP) assessment model, has been approved by the FDA to rule out the presence of venous thromboembolism (VTE) in outpatients suspected of deep venous thrombosis (DVT) or pulmonary embolism (PE).     BNP [[id number]] Collected:  05/21/18 2330    Specimen:  Blood Updated:  05/21/18 2354    Lactic Acid, Reflex Timer (This will reflex a repeat order 3-3:15 hours after ordered.) [[id number]] Collected:  05/21/18 2330    Specimen:  Blood Updated:  05/22/18 0000    Blood Gas, Arterial [[id number]]  (Abnormal) Collected:  05/21/18 2338    Specimen:  Arterial Blood Updated:  05/21/18 2340     Site Arterial: left radial     Tylor's Test Positive     pH, Arterial 7.475 (H) pH units      pCO2, Arterial 28.9 (L) mm Hg      pO2, Arterial 57.0 (L) mm Hg      HCO3, Arterial 21.3 (L) mmol/L      Base Excess, Arterial -1.5 (L) mmol/L      O2 Saturation Calculated 91.6 (L) %      Barometric Pressure for Blood Gas 749.7 mmHg      Modality Cannula     Flow Rate 3 lpm      Set [facility] Resp Rate 22    Narrative:       Meter: [id number] : [id number] Rachelle Gardner    Urinalysis With / Microscopic If Indicated - Urine, Clean Catch [[id number]]  (Abnormal) Collected:  05/21/18 2342    Specimen:  Urine from Urine, Clean Catch Updated:  05/22/18 0000     Color, UA Yellow     Appearance, UA Clear     pH, UA 5.5     Specific Gravity, UA 1.022     Glucose,  mg/dL (2+) (A)     Ketones, UA 40 mg/dL (2+) (A)     Bilirubin, UA Negative     Blood, UA Trace (A)     Protein,  mg/dL (2+) (A)     Leuk Esterase, UA Negative     Nitrite, UA Negative     Urobilinogen, UA 2.0 E.U./dL (A)    Urinalysis, Microscopic Only - Urine, Clean Catch [[id number]] Collected:  05/21/18 1351     Specimen:  Urine from Urine, Clean Catch Updated:  05/22/18 0000     RBC, UA 0-2 /HPF      WBC, UA 0-2 /HPF      Bacteria, UA None Seen /HPF      Squamous Epithelial Cells, UA 0-2 /HPF      Hyaline Casts, UA 0-2 /LPF      Methodology Automated Microscopy          I ordered the above labs and reviewed the results    RADIOLOGY  XR Chest 1 View   Preliminary Result   Right lung infiltrates, follow-up to resolution is recommended.              CT Head Without Contrast    (Results Pending)   CT Abdomen Pelvis With Contrast    (Results Pending)   CT Angiogram Chest With Contrast    (Results Pending)        I ordered the above noted radiological studies. Interpreted by radiologist. Reviewed by me in PACS.       PROCEDURES  Critical Care  Performed by: CHARLES DELUNA  Authorized by: CHARLES DELUNA     Critical care provider statement:     Critical care time (minutes):  60    Critical care time was exclusive of:  Separately billable procedures and treating other patients    Critical care was necessary to treat or prevent imminent or life-threatening deterioration of the following conditions:  Sepsis and respiratory failure    Critical care was time spent personally by me on the following activities:  Blood draw for specimens, development of treatment plan with patient or surrogate, discussions with consultants, obtaining history from patient or surrogate, interpretation of cardiac output measurements, examination of patient, evaluation of patient's response to treatment, review of old charts, re-evaluation of patient's condition, pulse oximetry, ordering and review of radiographic studies, ordering and review of laboratory studies and ordering and performing treatments and interventions    I assumed direction of critical care for this patient from another provider in my specialty: no            EKG           EKG time: 2354  Rhythm/Rate: tachycardic, 103  P waves and WV: normal  QRS, axis: normal axis   ST and T waves: normal      Interpreted Contemporaneously by me, independently viewed  Unchanged compared to prior 11/4/12      PROGRESS AND CONSULTS     2335  Will order 10 of xyprexa to calm the pt. Pt will be admitted to ICU after diagnostic testing is complete.    2341  Ordered additional blood work after bedside evaluation.    0005  Ordered CT head, CT abd/pelvis, and CTA chest for further evaluation. Placed call to pulmonology for consult.    0017  Discussed pt's case, labs, imaging, and EKG results with Dr. Lane (pulmonology) who will admit pt.    MEDICAL DECISION MAKING  Results were reviewed/discussed with the patient and they were also made aware of online access. Pt also made aware that some labs, such as cultures, will not be resulted during ER visit and follow up with PMD is necessary.     MDM  Number of Diagnoses or Management Options     Amount and/or Complexity of Data Reviewed  Clinical lab tests: ordered and reviewed (lactate=2.2, WBC=14.5, dimer=1.73)  Tests in the radiology section of CPT®: ordered and reviewed (CXR shows right lung infiltrate)  Tests in the medicine section of CPT®: ordered and reviewed (See procedure note.)  Discuss the patient with other providers: yes (Dr. Lane (pulmonology))  Independent visualization of images, tracings, or specimens: yes    Critical Care  Total time providing critical care: 30-74 minutes    Patient Progress  Patient progress: stable         DIAGNOSIS  Final diagnoses:   None       DISPOSITION  ADMISSION    Discussed treatment plan and reason for admission with pt/family and admitting physician.  Pt/family voiced understanding of the plan for admission for further testing/treatment as needed.       Latest Documented Vital Signs:  As of 12:49 AM  BP- (!) 190/100 HR- 119 Temp- (!) 105.9 °F (41.1 °C) (Rectal) O2 sat- 92%    --  Documentation assistance provided by shanta Pendleton for Dr. Gr.  Information recorded by the shanta was done at my direction and has been verified and  validated by me.     Osiris Pendleton  05/22/18 0050       Pj Gr MD  05/22/18 0663

## 2018-05-22 NOTE — ED NOTES
Dr. Gr notified that patient had positive simple sepsis screen.      Kerrie Lorenzo RN  05/22/18 0038

## 2018-05-22 NOTE — CONSULTS
Referring Provider: Zuleika Lane MD  [id number]  [address] [address]    Reason for Consultation: sepsis    History of present illness:  Mr Murrya is a 59 YO who I am asked to evaluate and give opinion for sepsis. History is obtained from the patient (limited) and review of the old medical records which I summarize/synthesize as follows: He was diagnosed with mantle cell lymphoma around 2010 and follows with the CBC group here at [facility]. He was last seen in clinic there in 5/2017 though appears he left prior to being seen. His oncologist recommended restarting Rituxan q3 months at that time due to increased activity of his mantle cell disease. He did not comply with this. I am told he then sought care at [facility] who recommend alcohol and drug abstinence prior to treatment so he then sought care and [facility] cancer center. I am told that he has not been on any chemotherapy but will need to confirm with his wife.     Of note, he had a recent admission at [facility] for psychiatric care. He was started on new medications of Depakote, Risperdal, and his dose of Prozac was decreased per the old records. There is concern that he has gone back to abusing alcohol and possibly narcotics.     He reportedly started having shortness of air with associated fevers and chills about 48 hours prior to admission. There were no alleviating factors. He has a cough that sounds very congested but is not bringing up any sputum.     In the ER he was febrile to 105 F with tachcyardia. Labs were notable for leukocytosis and procal 3. CXR with RLL infiltrate and CT chest confirms this. He has been started on IV vancomycin and cefepime.    PMH:  EtOH abuse  Mantle cell lymphoma  Clotting disorder  LIJ DVT  Dm2  GERD  Pancreatitis  Peripheral neuropathy  HTN      Past Surgical History:   Procedure Laterality Date   • CARPAL TUNNEL RELEASE  1999   • COLONOSCOPY     • ENDOSCOPY AND COLONOSCOPY N/A 3/9/2016    Procedure:  ESOPHAGOGASTRODUODENOSCOPY  WITH BX AND COLONOSCOPY TO CECUM/TI WITH BX POLYPECTOMY (COLD SNARE/BX);  Surgeon: Peter Flaherty MD;  Location: [facility] ENDOSCOPY;  Service:    • PILONIDAL CYST / SINUS EXCISION  01/29/2010       Social History:    EtOH abuse  Tobacco abuse  MJ use    Family History:  Mom: COPD  Dad: lung cancer    Allergies:  No Antibiotic Allergies    Medications:    Current Facility-Administered Medications:   •  acetaminophen (TYLENOL) tablet 650 mg, 650 mg, Oral, Q4H PRN **OR** acetaminophen (TYLENOL) suppository 650 mg, 650 mg, Rectal, Q4H PRN, Zuleika Lane MD, 650 mg at 05/22/18 0340  •  bisacodyl (DULCOLAX) EC tablet 5 mg, 5 mg, Oral, Daily PRN, Zuleika Lane MD  •  bisacodyl (DULCOLAX) suppository 10 mg, 10 mg, Rectal, Daily PRN, Zuleika Lane MD  •  cefepime (MAXIPIME) 2 g/100 mL 0.9% NS (mbp), 2 g, Intravenous, Q8H, Pj Gr MD  •  dexmedetomidine (PRECEDEX) 400 mcg/100 mL (4 mcg/mL) infusion, 0.2-1.5 mcg/kg/hr, Intravenous, Titrated, Zuleika Lane MD, Stopped at 05/22/18 0852  •  dextrose (D50W) solution 25 g, 25 g, Intravenous, Q15 Min PRN, Zuleika Lane MD  •  dextrose (GLUTOSE) oral gel 15 g, 15 g, Oral, Q15 Min PRN, Zuleika Lane MD  •  FLUoxetine (PROzac) capsule 80 mg, 80 mg, Oral, Daily, Zuleika Lane MD  •  folic acid (FOLVITE) tablet 400 mcg, 400 mcg, Oral, Daily, Zuleika Lane MD  •  gabapentin (NEURONTIN) capsule 300 mg, 300 mg, Oral, Q8H, Zuleika Lane MD  •  glucagon (human recombinant) (GLUCAGEN DIAGNOSTIC) injection 1 mg, 1 mg, Subcutaneous, PRN, Zuleika Lane MD  •  hydrALAZINE (APRESOLINE) injection 20 mg, 20 mg, Intravenous, Q4H PRN, Zuleika Lane MD, 20 mg at 05/22/18 0409  •  hydrOXYzine (ATARAX) tablet 25 mg, 25 mg, Oral, TID PRN, Zuleika Lane MD  •  ipratropium-albuterol (DUO-NEB) nebulizer solution 3 mL, 3 mL, Nebulization, Q6H PRN, Zuleika Lane MD  •  lisinopril (PRINIVIL,ZESTRIL) tablet 40 mg, 40 mg, Oral, Daily, Zuleika Lane MD  •   Magnesium Sulfate 2 gram Bolus, followed by 8 gram infusion (total Mg dose 10 grams)- Mg less than or equal to 1mg/dL, 2 g, Intravenous, PRN **OR** Magnesium Sulfate 2 gram / 50mL Infusion (GIVE X 3 BAGS TO EQUAL 6GM TOTAL DOSE) - Mg 1.1 - 1/5 mg/dl, 2 g, Intravenous, PRN **OR** Magnesium Sulfate 4 gram infusion- Mg 1.6-1.9 mg/dL, 4 g, Intravenous, PRN, Vishnu Marroquin MD  •  metoprolol tartrate (LOPRESSOR) injection 5 mg, 5 mg, Intravenous, Q6H, Zuleika Lane MD, 5 mg at 05/22/18 0244  •  metoprolol tartrate (LOPRESSOR) tablet 50 mg, 50 mg, Oral, Daily, Zuleika Lane MD  •  ondansetron (ZOFRAN) tablet 4 mg, 4 mg, Oral, Q6H PRN **OR** ondansetron ODT (ZOFRAN-ODT) disintegrating tablet 4 mg, 4 mg, Oral, Q6H PRN **OR** ondansetron (ZOFRAN) injection 4 mg, 4 mg, Intravenous, Q6H PRN, Zuleika Lane MD  •  Pharmacy to dose vancomycin, , Does not apply, Continuous PRN, Zuleika Lane MD  •  pneumococcal polysaccharide 23-valent (PNEUMOVAX-23) vaccine 0.5 mL, 0.5 mL, Intramuscular, During Hospitalization, Zuleika Lane MD  •  potassium chloride (MICRO-K) CR capsule 40 mEq, 40 mEq, Oral, PRN **OR** potassium chloride (KLOR-CON) packet 40 mEq, 40 mEq, Oral, PRN **OR** potassium chloride 10 mEq in 100 mL IVPB, 10 mEq, Intravenous, Q1H PRN, Vishnu Marroquin MD  •  risperiDONE (risperDAL) tablet 2 mg, 2 mg, Oral, Daily, Zuleika Lane MD  •  rosuvastatin (CRESTOR) tablet 10 mg, 10 mg, Oral, Daily, Zuleika Lane MD  •  sodium chloride 0.9 % flush 1-10 mL, 1-10 mL, Intravenous, PRN, Zuleika Lane MD  •  sodium chloride 0.9 % flush 10 mL, 10 mL, Intravenous, PRN, Pj Gr MD  •  Insert peripheral IV, , , Once **AND** sodium chloride 0.9 % flush 10 mL, 10 mL, Intravenous, PRN, Pj Gr MD  •  Insert peripheral IV, , , Once **AND** sodium chloride 0.9 % flush 10 mL, 10 mL, Intravenous, PRN, Pj Gr MD  •  sodium chloride 0.9 % infusion, 9 mL/hr, Intravenous, Continuous, Zuleika Lane MD, Last Rate:  9 mL/hr at 05/22/18 0400, 9 mL/hr at 05/22/18 0400  •  valproate (DEPACON) 500 mg in sodium chloride 0.9 % 50 mL IVPB, 500 mg, Intravenous, Q12H, Zuleika Lane MD, 500 mg at 05/22/18 0356  •  vancomycin 1250 mg/250 mL 0.9% NS IVPB ([facility]), 1,250 mg, Intravenous, Q12H, Zuleika Lane MD      Review of Systems  All systems were reviewed and are negative unless otherwise stated above in the HPI    Objective   Vital Signs   Temp:  [98.6 °F (37 °C)-105.9 °F (41.1 °C)] 98.6 °F (37 °C)  Heart Rate:  [] 117  Resp:  [40] 40  BP: (145-190)/() 161/76    Physical Exam:   General: lethargic, acute and chronically ill appearing  Head: Normocephalic, atraumatic, thick beard  Eyes: jpupils are pinpoint, no scleral icterus, no conjunctival pallor, no conjunctival hemorrhages.   ENT: MM dry, OP clear, no thrush. Poor dentition.   Neck: Supple  Cardiovascular: tachycardic, RR, no murmurs, rubs, or gallops; no LE edema  Respiratory: Rales in RLL; + coughing; no wheezing  GI: Abdomen is soft, non-tender, non-distended, normal bowel sounds in all four quadrants; no hepatosplenomegaly, no masses palpated  : condom catheter present  Musculoskeletal: no joint abnormalities, normal musculature  Skin: No rashes, lesions, or embolic phenomenon  Neurological: Alert and oriented x 0, moves all 4 extremities  Psychiatric: Normal mood and affect   Lymph: no pre-auricular, post-auricular, submandibular, cervical, supraclavicular  LAD  Vasc: no cyanosis; PIV w/o erythema    Labs:     Lab Results   Component Value Date    WBC 15.29 (H) 05/22/2018    HGB 12.0 (L) 05/22/2018    HCT 37.5 (L) 05/22/2018    MCV 95.7 05/22/2018     05/22/2018       Lab Results   Component Value Date    GLUCOSE 151 (H) 05/22/2018    BUN 12 05/22/2018    CREATININE 1.00 05/22/2018    EGFRIFNONA 77 05/22/2018    BCR 12.0 05/22/2018    CO2 20.8 (L) 05/22/2018    CALCIUM 8.6 05/22/2018    ALBUMIN 3.60 05/21/2018    LABIL2 1.3 05/21/2018    AST 11 05/21/2018     ALT 9 05/21/2018     Lab Results   Component Value Date    HGBA1C 5.70 (H) 05/22/2018     UA 0-2 WBCs  LA 2.5  Procal 3    Microbiology:  5/22 BCx: pending    Radiology (personally reviewed images/report):  CT head negative for acute process; R mastoid opacification and paranasal sinus thickening  CT CAP: RLL consolidation; negative for PE; negative abdomen and pelvis  CXR with RLL pneumonia    Assessment/Plan   1. Sepsis secondary to right lower lobe pneumonia  2. Mantle cell lymphoma  3. Substance abuse    Impressive RLL findings on CT scan (new from 2017) which I believe fully explains his presentation. It is possible he had some overdosing or polypharmacy that led to somnolence and then aspiration. Will continue vancomycin with goal trough 15-20. I will change the cefepime to Zosyn 3.375 g q8h given possible aspiration. I will check a urine antigen for Strep pneumo and Legionalla. I have also ordered an RVP and a respiratory culture. I do not believe that he needs an LP as we have an alternative diagnosis.     Thank you for this consult. ID will follow. Antibiotic plan and LP decision d/w Dr Marroquin and DANIEL Sharpe.

## 2018-05-22 NOTE — NURSING NOTE
About 9 am pt o2 sats dropped and pt was very very difficult to arouse. Precedex stopped. Dr. Marroquin at bedside, he placed a nasal trumpet to help keep airway open, as pt is demonstrating significant sleep apnea.   At rounds at 11am Dr. Marroquin notified pt is still very lethargic and difficult to arouse. Also pt has not made any urine since shift change this am.   At 1500 Dr. Marroquin notified that we still hadnt made any urine, so IV fluids were ordered.   At around 1530 Pt had a 9 beat run of Vtach Dr Marroquin notified, labs ordered.   Pt is much easier to arouse at this time, however falls back to sleep rapidly. He is aware that he is in Hospital and that it is 2018 but doesn't understand why he is ill.

## 2018-05-22 NOTE — PLAN OF CARE
Problem: Patient Care Overview  Goal: Plan of Care Review  Outcome: Ongoing (interventions implemented as appropriate)   05/22/18 0655   Coping/Psychosocial   Plan of Care Reviewed With patient   OTHER   Outcome Summary Pt admitted to CCU early this am for sepsis/AMS. upon arrival to unit pt was agitated, restless, not following commands. precedex gtt started and restraints initiated. Pt is in sinus tach, hypertensive, currently wakes but is confused/agitated. On 3L nc, awaiting results of CT of head/abdomen.      Goal: Individualization and Mutuality  Outcome: Ongoing (interventions implemented as appropriate)    Goal: Discharge Needs Assessment  Outcome: Ongoing (interventions implemented as appropriate)    Goal: Interprofessional Rounds/Family Conf  Outcome: Ongoing (interventions implemented as appropriate)      Problem: Confusion, Acute (Adult)  Goal: Identify Related Risk Factors and Signs and Symptoms  Outcome: Ongoing (interventions implemented as appropriate)    Goal: Cognitive/Functional Impairments Minimized  Outcome: Ongoing (interventions implemented as appropriate)    Goal: Safety  Outcome: Ongoing (interventions implemented as appropriate)      Problem: Skin Injury Risk (Adult)  Goal: Identify Related Risk Factors and Signs and Symptoms  Outcome: Ongoing (interventions implemented as appropriate)    Goal: Skin Health and Integrity  Outcome: Ongoing (interventions implemented as appropriate)

## 2018-05-22 NOTE — H&P
History and Physical    Patient Name: Abdelrahman Murray  Age/Sex: 58 y.o. male  : 1960  MRN: [id number]    Date of Admission: 2018  Date of Encounter Visit: 18  Encounter Provider: Zuleika Lane MD  Referring Provider: Zuleika Lane MD  Place of Service: [facility]   Patient Care Team:  ILIANA Parrish as PCP - General (Family Medicine)  Sharon Cheema MD as PCP - Family Medicine  Eren Leonardo MD as Consulting Physician (Hematology and Oncology)  Wally Sellers Jr., MD as Referring Physician (Internal Medicine)  Anish Hughes MD as Consulting Physician (Hematology and Oncology)      Subjective:     Chief Complaint: Fever and chest pain and respiratory distress    History of Present Illness:  Abdelrahman Murray is a 58 y.o. male with history of mantle cell lymphoma that was being treated by oncology and he followed with both [facility] and [facility] in [place].  He has been on no treatment for the last year, the lymphoma involvement of the neck and the mediastinum and the stomach and retroperitoneal area.  His last chemotherapy session was more than a year ago.  Patient has history of hypertension on medication and history of stroke 2 sleep apnea on no treatment, he has history of recurrent bronchitis but that was mainly when he was getting active chemotherapy and has not had much problem since he stopped.  According to the wife he started having symptoms this past Saturday with some malaise and later on developed some chills and on 18 the chills were getting much worse and he was covered with several layer and using heating blanket and still getting chills, there was no altered mental status prior to presentation, there was no documented seizure activity.  Patient had history of alcohol abuse resulting in some psychiatric disturbances requiring admission at some point [facility] but according the wife he quit drinking for a  few months now and when she is at work he has his friends that usually try to watch over him as well and she strongly doubt he can has his hands on any alcohol  On presentation to the ER the patient was febrile 105.5, his chest x-ray showed pneumonia, he was supposed to have CT of the head chest and abdomen for further evaluation however he was so restless and agitated at the time and the CAT scan was canceled.  He has history of diabetes mellitus but he lost weight with the chemotherapy but he still on medication.    Pulmonary Functions Testing Results:    No results found for: FEV1, FVC, ACO5EQX, TLC, DLCO    Review of Systems:   Review of Systems  Constitutional: Negative for activity change, appetite change and fever (uncontrolled L was confirmed in the ER).   HENT: Negative for congestion and sore throat.    Respiratory: Positive for shortness of breath.  Positive for chronic cough, positive for pleuritic chest pain.  Productive cough    Cardiovascular: Negative for chest pain and leg swelling.   Gastrointestinal: Negative for abdominal pain, diarrhea and vomiting.   Genitourinary: Negative for decreased urine volume and dysuria.   Musculoskeletal: Negative for neck pain.  Positive for chronic low back pain  Skin: Negative for rash and wound.   Neurological: Negative for weakness, numbness and headaches.   All other systems reviewed and are negative.  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   • Diabetes mellitus    • GERD (gastroesophageal reflux disease)    • H/O Alcohol-induced pancreatitis    • H/O Ankle fracture 2009   • H/O Peripheral neuropathy     when on vincristine and Velcade   • Hypertension    • Lymphoma        Past Surgical History:   Procedure Laterality Date   • CARPAL TUNNEL RELEASE  1999   • COLONOSCOPY     • ENDOSCOPY AND COLONOSCOPY N/A 3/9/2016    Procedure:  ESOPHAGOGASTRODUODENOSCOPY  WITH BX AND COLONOSCOPY TO CECUM/TI WITH BX POLYPECTOMY (COLD SNARE/BX);  Surgeon: Peter Flaherty MD;  Location: [facility] ENDOSCOPY;  Service:    • PILONIDAL CYST / SINUS EXCISION  01/29/2010       Home Medications:     (Not in a hospital admission)    Inpatient Medications:  Scheduled Meds:  cefepime 2 g Intravenous Once   cefepime 2 g Intravenous Q8H     Continuous Infusions:   PRN Meds:.sodium chloride  •  Insert peripheral IV **AND** sodium chloride  •  Insert peripheral IV **AND** sodium chloride    Allergies:  Allergies   Allergen Reactions   • Adult Aspirin Ec [Aspirin] GI Intolerance     Intolerance-causes stomach irritation   • Crestor [Rosuvastatin] Diarrhea and GI Intolerance       Past Social History:  Social History     Social History   • Marital status:      Spouse name: Allegra     Occupational History   •  Self-Employed     Social History Main Topics   • Smoking status: Former Smoker     Packs/day: 1.00     Years: 38.00     Types: Cigarettes     Start date: 2/12/1977     Quit date: 12/27/2012   • Alcohol use 7.2 oz/week     12 Cans of beer per week      Comment: Stopped 12-   • Drug use: Yes     Types: Marijuana   • Sexual activity: Defer     Other Topics Concern   • Not on file       Past Family History:  Family History   Problem Relation Age of Onset   • Lung cancer Father         Lung with brain metastases   • No Known Problems Sister    • COPD Mother         smoker   • No Known Problems Brother    • No Known Problems Maternal Grandmother    • No Known Problems Maternal Grandfather    • Uterine cancer Paternal Grandmother    • No Known Problems Paternal Grandfather    • Lung cancer Other    • No Known Problems Sister    • No Known Problems Sister    • Diabetes Paternal Uncle    • Bone cancer Paternal Aunt            Objective:   Temp:  [98.9 °F (37.2 °C)-105.9 °F (41.1 °C)] 104.6 °F (40.3 °C)  Heart Rate:  [108-119] 108  Resp:  [40] 40  BP:  (155-190)/() 155/83   SpO2:  [92 %-93 %] 93 %  on       No intake or output data in the 24 hours ending 05/22/18 0124  Body mass index is 25.06 kg/m².  1    05/21/18  2308   Weight: 72.6 kg (160 lb)     Weight change:     Physical Exam:   Physical Exam   General:  Restlessness and agitated, moving all extremity, strength is 5 over 5, screaming and yelling  .  The neck was supple with full range of motion with no evidence of stiffness                    Head:  No facial asymmetry   Eyes:          Conjunctivae and sclerae normal, no icterus, PERRLA   Throat:   No oral lesions, no thrush, oral mucosa moist.    Neck:   Supple, trachea midline.Scar on the right neck from previous surgical biopsy, slight fullness anteriorly but no obvious palpable lymph node.  No jugular venous distention    Lungs:     Normal chest on inspection,Positive rhonchi with wet secretion upon coughing, positive crackles on the right side, positive wheezing on the right side    Heart:    Regular rhythm and Rapid heart rate.  No murmurs, gallops, or rubs noted.   Abdomen:     Soft, non-tender, non-distended, positive bowel sounds. Patient had urine all over his underwear from urinary incontinence    Extremities:   No clubbing, cyanosis, or edema.     Pulses:   Pulses palpable and equal bilaterally.    Skin:   No bleeding or rash.   Neuro:   Non-focal.  Moves all extremities well.Confused but can still follow simple commands    Psychiatric:   Normal mood and affect.     Lab Review:     Results from last 7 days  Lab Units 05/21/18  2330   SODIUM mmol/L 137   POTASSIUM mmol/L 3.4*   CHLORIDE mmol/L 98   CO2 mmol/L 22.4   BUN mg/dL 13   CREATININE mg/dL 1.14   GLUCOSE mg/dL 199*   CALCIUM mg/dL 9.1   AST (SGOT) U/L 11   ALT (SGPT) U/L 9   ALBUMIN g/dL 3.60       Results from last 7 days  Lab Units 05/21/18  2330   TROPONIN T ng/mL <0.010       Results from last 7 days  Lab Units 05/21/18  2330   WBC 10*3/mm3 14.50*   HEMOGLOBIN g/dL 13.1*    HEMATOCRIT % 39.7*   PLATELETS 10*3/mm3 207   MCV fL 93.6   MCH pg 30.9   MCHC g/dL 33.0   RDW % 13.3   RDW-SD fl 45.5   MPV fL 10.2   NEUTROPHIL % % 92.8*   LYMPHOCYTE % % 4.0*   MONOCYTES % % 2.8*   EOSINOPHIL % % 0.1*   BASOPHIL % % 0.1   IMM GRAN % % 0.2   NEUTROS ABS 10*3/mm3 13.47*   LYMPHS ABS 10*3/mm3 0.58*   MONOS ABS 10*3/mm3 0.40   EOS ABS 10*3/mm3 0.01   BASOS ABS 10*3/mm3 0.01   IMMATURE GRANS (ABS) 10*3/mm3 0.03       Results from last 7 days  Lab Units 05/21/18  2330   INR  1.22*       Results from last 7 days  Lab Units 05/21/18  2330   MAGNESIUM mg/dL 2.0           Invalid input(s): LDLCALC    Results from last 7 days  Lab Units 05/21/18  2330   PROBNP pg/mL 1,851.0*           Results from last 7 days  Lab Units 05/21/18  2338   PH, ARTERIAL pH units 7.475*   PCO2, ARTERIAL mm Hg 28.9*   PO2 ART mm Hg 57.0*   HCO3 ART mmol/L 21.3*       Results from last 7 days  Lab Units 05/21/18  2330   PROCALCITONIN ng/mL 3.07*   LACTATE mmol/L 2.2*                   Results from last 7 days  Lab Units 05/21/18  2342   NITRITE UA  Negative   WBC UA /HPF 0-2   BACTERIA UA /HPF None Seen   SQUAM EPITHEL UA /HPF 0-2               Imaging:  Imaging Results (most recent)     Procedure Component Value Units Date/Time    XR Chest 1 View [[id number]] Collected:  05/22/18 0002     Updated:  05/22/18 0002    Narrative:       X-RAY CHEST 1 VIEW.     HISTORY: Shortness breath.     COMPARISON: 4/13/2017.     FINDINGS:  Cardiomediastinal silhouette is within normal limits.         New abnormal opacity of the right lung may be due to infiltrate.  Elevation of right hemidiaphragm. Lung volumes are low.              Impression:       Right lung infiltrates, follow-up to resolution is recommended.                 I personally viewed and interpreted the patient's imaging studies: Right sided infiltrate affecting right lower lobe medially and laterally in affecting the lateral part of the upper lobes as well with minimal fluid in  the major fissure.  Left lung was clean, no cardiomegaly, possible right pleural effusion.    Assessment:   1. Right lower lobe pneumonia  2. Sepsis  3. Altered mental status  4. Mantle cell lymphoma  5. History of alcoholic pancreatitis and alcohol abuse but none recently  6. Diabetes mellitus  7. Hypertension  8. Acute hypoxemic respiratory failure  9. Toxic metabolic encephalopathy  10. History of obstructive sleep apnea, no Pap therapy      Plan:     Patient has confusion and agitation and we will cover with vancomycin and cefepime for possible central nervous system infection as well even though the likelihood is small given the lack of any neck stiffness on exam, consider stepping down on the antibiotics in a.m.  I will hold on the lumbar puncture will consider that to monitor the patient mental status does not improve, we'll follow-up on the CBC and chemistry, will treat with IV fluid management for sepsis, sliding scale insulin, DVT or flexes with no pharmacological therapy given the possibility of lumbar puncture tomorrow.  Restraints will be used to prevent any self injury and to allow us to provide care for this gentleman,  patient was examined in the emergency room and later on in the CCU, discussed with Dr. Pj Gr and discussed with the wife and discussed with the CCU staff     Time: Critical care 40 min    Zuleika Lane MD  [facility]   05/22/18  1:24 AM    Dictated utilizing Dragon dictation:  Much of this encounter note is an electronic transcription/translation of spoken language to printed text. The electronic translation of spoken language may permit erroneous, or at times, nonsensical words or phrases to be inadvertently transcribed; Although I have reviewed the note for such errors, some may still exist.

## 2018-05-22 NOTE — PROGRESS NOTES
LOS: 0 days   Patient Care Team:  ILIANA Parrish as PCP - General (Family Medicine)  Sharon Cheema MD as PCP - Family Medicine  Eren Leonardo MD as Consulting Physician (Hematology and Oncology)  Wally Sellers Jr., MD as Referring Physician (Internal Medicine)  Anish Hughes MD as Consulting Physician (Hematology and Oncology)    Subjective     I was called to see patient the bedside stat this morning less responsive with decreased SPO2 he been maintained apparently on 2 L nasal turning up to 6 L to keep his saturations over 90.  My arrival patient was pretty unresponsive only when deep suctioned him denied get some response from him.    Review of Systems:   Not available       Objective     Vital Signs  Vital Sign Min/Max for last 24 hours  Temp  Min: 98.6 °F (37 °C)  Max: 105.9 °F (41.1 °C)   BP  Min: 145/86  Max: 190/100   Pulse  Min: 58  Max: 119   Resp  Min: 40  Max: 40   SpO2  Min: 92 %  Max: 98 %   Flow (L/min)  Min: 3  Max: 6   Weight  Min: 72.6 kg (160 lb)  Max: 74.7 kg (164 lb 10.9 oz)        Ventilator/Non-Invasive Ventilation Settings     None                       Body mass index is 25.79 kg/m².  I/O last 3 completed shifts:  In: 760 [I.V.:760]  Out: -   No intake/output data recorded.        Physical Exam:  General Appearance: We have a well-developed white male who is lying in bed with obvious obstructive apneas pretty severe.  He is on 6 L O2 with sats of 90-91%.  We get him upright some jaw thrust improved his oxygen I placed a nasal trumpet without difficulty and his left Morse and on 6 L his sats are now 95%.  Patient did wake up a little bit somewhat combative replace the nasal trumpet but not really following commands or answering questions.  Eyes: Conjunctiva are clear and anicteric pupils are pinpoint  ENT: He does have a Mallampati 3 or 4 airway best I can tell he is not opening and cooperating but he has a very large tongue obstructing his membranes are  little dry  Neck: AQ is midline I don't appreciate jugular venous distention there is no palpable adenopathy or thyromegaly  Lungs: He has a lot of upper airway obstructive type sounds and some coarse rhonchi bilaterally with a few crackles course in the right base  Cardiac: Tachycardic regular rhythm I don't appreciate a murmur or rub.  Abdomen: Soft I do not palpate hepatosplenomegaly or masses  : Normal external male genitalia  Musc/Skel: Grossly normal  Skin: No jaundice no petechiae no significant rashes noted  Neuro: Patient is moving all 4 extremities to noxious stimuli such as a suctioning her placing the nasal trumpet but he is not following any commands  Extremities/P Vascular: No clubbing or cyanosis he has palpable fairly strong radial and dorsalis pedis pulses bilaterally  MSE: Unable to evaluate       Labs:    Results from last 7 days  Lab Units 05/22/18  0306 05/21/18  2330   GLUCOSE mg/dL 151* 199*   SODIUM mmol/L 139 137   POTASSIUM mmol/L 3.3* 3.4*   MAGNESIUM mg/dL  --  2.0   CO2 mmol/L 20.8* 22.4   CHLORIDE mmol/L 102 98   ANION GAP mmol/L 16.2 16.6   CREATININE mg/dL 1.00 1.14   BUN mg/dL 12 13   BUN / CREAT RATIO  12.0 11.4   CALCIUM mg/dL 8.6 9.1   EGFR IF NONAFRICN AM mL/min/1.73 77 66   ALK PHOS U/L  --  92   TOTAL PROTEIN g/dL  --  6.4   ALT (SGPT) U/L  --  9   AST (SGOT) U/L  --  11   BILIRUBIN mg/dL  --  0.5   ALBUMIN g/dL  --  3.60   GLOBULIN gm/dL  --  2.8   A/G RATIO g/dL  --  1.3     Estimated Creatinine Clearance: 87.2 mL/min (by C-G formula based on SCr of 1 mg/dL).        Results from last 7 days  Lab Units 05/22/18  0306 05/21/18  2330   WBC 10*3/mm3 15.29* 14.50*   RBC 10*6/mm3 3.92* 4.24*   HEMOGLOBIN g/dL 12.0* 13.1*   HEMATOCRIT % 37.5* 39.7*   MCV fL 95.7 93.6   MCH pg 30.6 30.9   MCHC g/dL 32.0* 33.0   RDW % 13.3 13.3   RDW-SD fl 46.2 45.5   MPV fL 10.5 10.2   PLATELETS 10*3/mm3 162 207   NEUTROPHIL % %  --  92.8*   LYMPHOCYTE % %  --  4.0*   MONOCYTES % %  --  2.8*    EOSINOPHIL % %  --  0.1*   BASOPHIL % %  --  0.1   IMM GRAN % %  --  0.2   NEUTROS ABS 10*3/mm3  --  13.47*   LYMPHS ABS 10*3/mm3  --  0.58*   MONOS ABS 10*3/mm3  --  0.40   EOS ABS 10*3/mm3  --  0.01   BASOS ABS 10*3/mm3  --  0.01   IMMATURE GRANS (ABS) 10*3/mm3  --  0.03       Results from last 7 days  Lab Units 05/21/18  2338   PH, ARTERIAL pH units 7.475*   PO2 ART mm Hg 57.0*   PCO2, ARTERIAL mm Hg 28.9*   HCO3 ART mmol/L 21.3*       Results from last 7 days  Lab Units 05/21/18  2330   TROPONIN T ng/mL <0.010       Results from last 7 days  Lab Units 05/21/18  2330   PROBNP pg/mL 1,851.0*           Results from last 7 days  Lab Units 05/22/18  0350 05/21/18  2330   LACTATE mmol/L 2.5* 2.2*   PROCALCITONIN ng/mL  --  3.07*       Results from last 7 days  Lab Units 05/21/18  2330   INR  1.22*     Microbiology Results (last 10 days)     ** No results found for the last 240 hours. **                cefepime 2 g Intravenous Q8H   FLUoxetine 80 mg Oral Daily   folic acid 400 mcg Oral Daily   gabapentin 300 mg Oral Q8H   lisinopril 40 mg Oral Daily   metoprolol tartrate 5 mg Intravenous Q6H   metoprolol tartrate 50 mg Oral Daily   risperiDONE 2 mg Oral Daily   rosuvastatin 10 mg Oral Daily   valproate sodium 500 mg Intravenous Q12H   vancomycin 1,250 mg Intravenous Q12H       dexmedetomidine 0.2-1.5 mcg/kg/hr Last Rate: Stopped (05/22/18 0852)   Pharmacy to dose vancomycin     sodium chloride 9 mL/hr Last Rate: 9 mL/hr (05/22/18 0400)       Diagnostics:  Ct Head Without Contrast    Result Date: 5/22/2018  CT HEAD WITHOUT CONTRAST  HISTORY: Altered mental status. Fever.  TECHNIQUE: Head CT includes axial imaging from the base of skull to vertex without IV contrast.  COMPARISON: There is no previous head CT for comparison.  FINDINGS: There are no abnormal areas of increased attenuation intra-axially to suggest hemorrhage. No extra-axial fluid collection is observed. There is no evidence for cerebral edema or mass  effect or shift of midline structures. Ventricles appear within normal limits for size and configuration. There is mild fluid within the right mastoid air cells. There is mild frontal, maxillary, sphenoid and ethmoid sinus mucosal thickening.      1. No evidence for acute intracranial abnormality. 2. Partial opacification of right mastoid air cells with fluid. 3. Mild paranasal sinus mucoperiosteal thickening.  Radiation dose reduction techniques were utilized, including automated exposure control and exposure modulation based on body size.  This report was finalized on 5/22/2018 8:13 AM by Dr. Carlos Ahn M.D.      Ct Abdomen Pelvis With Contrast    Result Date: 5/22/2018  CT ANGIOGRAM CHEST WITH IV CONTRAST, CT ABDOMEN AND PELVIS WITH IV CONTRAST  HISTORY: 58-year-old with lung cancer with shortness of air. Chills, fever.  TECHNIQUE: CT angiogram of the chest includes axial imaging from the thoracic inlet to upper abdomen with IV contrast.  This data was reconstructed in coronal and sagittal planes. 3-D volume rendering was performed.  CT abdomen and pelvis was performed with IV contrast.  COMPARISON: PET/CT 04/28/2017, CT neck, chest, abdomen and pelvis 04/13/2017.  FINDINGS: CT ANGIOGRAM CHEST: Evaluation is limited by the degree of motion-related artifact. No large or central pulmonary embolus is evident. Evaluation of segmental and subsegmental branches is very limited by motion-related artifact. Heart size is enlarged. There is dense right lower lobe pulmonary consolidation and right lower lobe air bronchograms are present. There is consolidation to lesser degree within the posterior medial left lower lobe in the posterior right upper lobe. Material is present along the posterior wall of the right mainstem bronchus. Well-circumscribed partially calcified granuloma is present in the posterior left lower lobe measuring 11 mm.  Enlarged node in the left supraclavicular fossa measures 1.6 cm and measured 1.5  cm on the previous CT of 04/13/2017.  There is no axillary magdaleno enlargement. A precarinal lymph node measures 2.1 x 1.0 cm and this has increased from 1.7 x 0.8 cm. There is mild gynecomastia. Moderate right and small left pleural effusions are present. Bone windows demonstrate no osseous lesion.   ABDOMEN/PELVIS: There is streak artifact overlying the upper abdomen associated with patient scanned with his arms to sides. There is also limitation due to motion-related artifact. Liver, spleen, adrenal glands, pancreas appear within normal limits allowing for motion artifact. Kidneys are within normal limits. Previous CT better demonstrated linear soft tissue density in the region of the gastrohepatic ligament and along the celiac axis and this is not clearly visualized.  Small  retroperitoneal lymph nodes are without change. There is no evidence for bowel obstruction or intra-abdominal abscess formation. No magdaleno enlargement is evident within the pelvis. There is Schmorl's node formation along the anterosuperior endplate of L3 with chronic separation of its anterior superior corner and this is without change compared with CT 04/13/2017.      1. Dense right lower lobe consolidation consistent with infiltrate with moderate right and small left pleural effusions. There is also consolidation to a lesser degree within the posterior right upper lobe, right lower lobe and left lower lobe. Mild pulmonary emphysema. 2. No evidence for pulmonary thromboembolic disease though evaluation of the distal segmental and subsegmental branches is limited due to motion-related artifact. 3. 1.6 cm left supraclavicular fossa node is slightly increased from 1.5 cm on the prior exam of 04/13/2017. There has also been mild increase in size of precarinal lymph node. 4. No evidence for acute abnormality in the abdomen or pelvis or significant change when compared to prior CT abdomen and pelvis 04/13/2017 allowing for motion limitation.    Radiation dose reduction techniques were utilized, including automated exposure control and exposure modulation based on body size.  This report was finalized on 5/22/2018 8:16 AM by Dr. Carlos Ahn M.D.      Xr Chest 1 View    Result Date: 5/22/2018  X-RAY CHEST 1 VIEW.  HISTORY: Shortness breath.  COMPARISON: 4/13/2017.  FINDINGS: Cardiomediastinal silhouette is within normal limits.     New abnormal opacity of the right lung may be due to infiltrate. Elevation of right hemidiaphragm. Lung volumes are low.          Right lung infiltrates, follow-up to resolution is recommended.         Ct Angiogram Chest With Contrast    Result Date: 5/22/2018  CT ANGIOGRAM CHEST WITH IV CONTRAST, CT ABDOMEN AND PELVIS WITH IV CONTRAST  HISTORY: 58-year-old with lung cancer with shortness of air. Chills, fever.  TECHNIQUE: CT angiogram of the chest includes axial imaging from the thoracic inlet to upper abdomen with IV contrast.  This data was reconstructed in coronal and sagittal planes. 3-D volume rendering was performed.  CT abdomen and pelvis was performed with IV contrast.  COMPARISON: PET/CT 04/28/2017, CT neck, chest, abdomen and pelvis 04/13/2017.  FINDINGS: CT ANGIOGRAM CHEST: Evaluation is limited by the degree of motion-related artifact. No large or central pulmonary embolus is evident. Evaluation of segmental and subsegmental branches is very limited by motion-related artifact. Heart size is enlarged. There is dense right lower lobe pulmonary consolidation and right lower lobe air bronchograms are present. There is consolidation to lesser degree within the posterior medial left lower lobe in the posterior right upper lobe. Material is present along the posterior wall of the right mainstem bronchus. Well-circumscribed partially calcified granuloma is present in the posterior left lower lobe measuring 11 mm.  Enlarged node in the left supraclavicular fossa measures 1.6 cm and measured 1.5 cm on the previous CT of  04/13/2017.  There is no axillary magdaleno enlargement. A precarinal lymph node measures 2.1 x 1.0 cm and this has increased from 1.7 x 0.8 cm. There is mild gynecomastia. Moderate right and small left pleural effusions are present. Bone windows demonstrate no osseous lesion.   ABDOMEN/PELVIS: There is streak artifact overlying the upper abdomen associated with patient scanned with his arms to sides. There is also limitation due to motion-related artifact. Liver, spleen, adrenal glands, pancreas appear within normal limits allowing for motion artifact. Kidneys are within normal limits. Previous CT better demonstrated linear soft tissue density in the region of the gastrohepatic ligament and along the celiac axis and this is not clearly visualized.  Small  retroperitoneal lymph nodes are without change. There is no evidence for bowel obstruction or intra-abdominal abscess formation. No magdaleno enlargement is evident within the pelvis. There is Schmorl's node formation along the anterosuperior endplate of L3 with chronic separation of its anterior superior corner and this is without change compared with CT 04/13/2017.      1. Dense right lower lobe consolidation consistent with infiltrate with moderate right and small left pleural effusions. There is also consolidation to a lesser degree within the posterior right upper lobe, right lower lobe and left lower lobe. Mild pulmonary emphysema. 2. No evidence for pulmonary thromboembolic disease though evaluation of the distal segmental and subsegmental branches is limited due to motion-related artifact. 3. 1.6 cm left supraclavicular fossa node is slightly increased from 1.5 cm on the prior exam of 04/13/2017. There has also been mild increase in size of precarinal lymph node. 4. No evidence for acute abnormality in the abdomen or pelvis or significant change when compared to prior CT abdomen and pelvis 04/13/2017 allowing for motion limitation.   Radiation dose reduction  techniques were utilized, including automated exposure control and exposure modulation based on body size.  This report was finalized on 5/22/2018 8:16 AM by Dr. Carlos Ahn M.D.           CT chest abdomen and pelvis CT chest does show bibasilar infiltrates right greater than left there are small bilateral pleural effusions bilaterally right is slightly greater than the left are some small mediastinal nodes are slightly enlarged but minimal change from 4/17    Active Hospital Problems (** Indicates Principal Problem)    Diagnosis Date Noted   • RLL pneumonia [J18.1] 05/22/2018      Resolved Hospital Problems    Diagnosis Date Noted Date Resolved   No resolved problems to display.         Assessment/Plan     1. Bilateral pneumonia bibasilar concern for aspiration patient is on cefepime and vancomycin.  2. Respiratory failure acute at least in part due to pneumonia I think he also has significant obstructive apneas that are contributing but he is not protecting his airway real well.  Patient is a DO NOT INTUBATE DO NOT RESUSCITATE.  Therefore I have placed a nasal trumpet to try and keep his airway open.  3. Altered mental status etiology is not clear that also be related to sepsis he metabolic encephalopathy, could this be a meningitis he certainly doesn't seem to have any nuchal rigidity and I am worried about drug use he has a history his alcohol level was negative but nursing reports she's received no narcotics and his pupils are pinpoint I will check a urine drug screen I will ask neurology to also evaluate.  If LP is indicated we'll ask for flow cytometry as well  4. Treatment of mantle cell lymphoma apparently not been candidate for treatment due to substance abuse  5. Lactic acidosis I will trend his lactate monitoring as a guide for therapy.  6. Hypokalemia replace  7. Hypertension he is on a number of antihypertensives at home until his neuro status left improved before he can take by mouth I will leave  some when necessary IV medications  8. Diabetes mellitus type 2 sliding scale insulin  9. Obstructive sleep apnea has a history of it but his Pap noncompliant he very clearly has sleep apnea on exam today  10. History of alcohol abuse and other substance abuse his alcohol level was normal on admission I will check a urine drug screen they do have urine from admission I'll ask them to run it on this sample.  Line gastroesophageal reflux disease  11. Peripheral neuropathy related reportedly to medication  12. History left IJ DVT secondary to vein port  13. Dyslipidemia    Plan for disposition:    Vishnu Marroquin MD  05/22/18  9:25 AM    Time: critical care time 50min

## 2018-05-22 NOTE — CONSULTS
Patient Identification:  NAME:  Abdelrahman Murray  Age:  58 y.o.   Sex:  male   :  1960   MRN:  [id number]       Chief complaint: He does not have one, reason for consult confusion and agitation lethargy    History of present illness:  This patient is a 58-year-old white male with history of lung cancer/lymphoma/Mantle cell lymphoma with history of alcohol is some peripheral neuropathy and history of DVT she comes in now with some change in mental status confusion and has had fever.  Context patient with right lower lobe pneumonia modifying factors he's on several different antibiotics including Maxipime and vancomycin associated symptoms he's lethargic and confused context the patient who is been an alcoholic in his life and did test positive for methamphetamine when he came in.  It is not clear if she's been drinking recently or not.      Past medical history:  Past Medical History:   Diagnosis Date   • Alcohol abuse    • Cancer     H/O Mantle cell lymphoma   • Clotting disorder     DVT left internal jugular vein port associated   • Diabetes mellitus    • GERD (gastroesophageal reflux disease)    • H/O Alcohol-induced pancreatitis    • H/O Ankle fracture    • H/O Peripheral neuropathy     when on vincristine and Velcade   • Hypertension    • Lymphoma        Past surgical history:  Past Surgical History:   Procedure Laterality Date   • CARPAL TUNNEL RELEASE     • COLONOSCOPY     • ENDOSCOPY AND COLONOSCOPY N/A 3/9/2016    Procedure: ESOPHAGOGASTRODUODENOSCOPY  WITH BX AND COLONOSCOPY TO CECUM/TI WITH BX POLYPECTOMY (COLD SNARE/BX);  Surgeon: Peter Flaherty MD;  Location: [facility] ENDOSCOPY;  Service:    • PILONIDAL CYST / SINUS EXCISION  2010       Allergies:  Adult aspirin ec [aspirin] and Crestor [rosuvastatin]    Home medications:  Prescriptions Prior to Admission   Medication Sig Dispense Refill Last Dose   • B Complex Vitamins (VITAMIN B COMPLEX) capsule capsule    Past Week at  Unknown time   • divalproex (DEPAKOTE) 500 MG DR tablet    Past Week at Unknown time   • FLUoxetine (PROZAC) 40 MG capsule Take 2 capsules by mouth Daily. 180 capsule 3 Past Week at Unknown time   • folic acid (FOLVITE) 1 MG tablet    Past Week at Unknown time   • gabapentin (NEURONTIN) 300 MG capsule TAKE 1 CAPSULE THREE TIMES DAILY 270 capsule 1 Past Week at Unknown time   • hydrOXYzine (ATARAX) 25 MG tablet TAKE 1 TABLET THREE TIMES DAILY AS NEEDED FOR ANXIETY 90 tablet 1 5/22/2018 at Unknown time   • lisinopril (PRINIVIL,ZESTRIL) 40 MG tablet TAKE 1 TABLET EVERY DAY 90 tablet 0 Past Week at Unknown time   • metFORMIN (GLUCOPHAGE) 500 MG tablet Take 1 tablet by mouth Daily. 90 tablet 1 Past Week at Unknown time   • metoprolol tartrate (LOPRESSOR) 50 MG tablet Take 50 mg by mouth.   Past Week at Unknown time   • montelukast (SINGULAIR) 10 MG tablet Take 1 tablet by mouth Every Night. 90 tablet 0 5/22/2018 at Unknown time   • omeprazole (priLOSEC) 20 MG capsule Take 1 capsule by mouth Daily. 90 capsule 0 Past Week at Unknown time   • risperiDONE (risperDAL) 2 MG tablet    Past Week at Unknown time   • rosuvastatin (CRESTOR) 10 MG tablet Take 10 mg by mouth Daily.   Past Week at Unknown time   • acetaminophen (TYLENOL) 500 MG tablet Take 1 tablet by mouth Every 6 (Six) Hours As Needed for Mild Pain (1-3). 60 tablet 1 Taking   • acetaminophen-codeine (TYLENOL #3) 300-30 MG per tablet Take 1 tablet by mouth Daily As Needed for Moderate Pain . 30 tablet 0 Not Taking   • baclofen (LIORESAL) 10 MG tablet As Needed.   Not Taking   • metoprolol tartrate (LOPRESSOR) 50 MG tablet TAKE 1 TABLET EVERY DAY 90 tablet 0 Taking   • metoprolol tartrate (LOPRESSOR) 50 MG tablet TAKE 1 TABLET EVERY DAY 90 tablet 0         Hospital medications:    [START ON 5/23/2018] FLUoxetine 40 mg Oral Daily   folic acid 400 mcg Oral Daily   gabapentin 300 mg Oral Q8H   lisinopril 40 mg Oral Daily   metoprolol tartrate 5 mg Intravenous Q6H    metoprolol tartrate 50 mg Oral Daily   piperacillin-tazobactam 3.375 g Intravenous Q8H   risperiDONE 2 mg Oral Daily   rosuvastatin 10 mg Oral Daily   valproate sodium 500 mg Intravenous Q12H   vancomycin 1,250 mg Intravenous Q12H       dexmedetomidine 0.2-1.5 mcg/kg/hr Last Rate: Stopped (05/22/18 0852)   lactated ringers 125 mL/hr    Pharmacy to dose vancomycin     sodium chloride 9 mL/hr Last Rate: 9 mL/hr (05/22/18 0400)     •  acetaminophen **OR** acetaminophen  •  bisacodyl  •  bisacodyl  •  dextrose  •  dextrose  •  glucagon (human recombinant)  •  hydrALAZINE  •  hydrOXYzine  •  ipratropium-albuterol  •  magnesium sulfate **OR** magnesium sulfate **OR** magnesium sulfate  •  ondansetron **OR** ondansetron ODT **OR** ondansetron  •  Pharmacy to dose vancomycin  •  pneumococcal polysaccharide 23-valent  •  potassium chloride **OR** potassium chloride **OR** potassium chloride  •  potassium chloride  •  sodium chloride  •  sodium chloride  •  Insert peripheral IV **AND** sodium chloride  •  Insert peripheral IV **AND** sodium chloride    Family history:  Family History   Problem Relation Age of Onset   • Lung cancer Father         Lung with brain metastases   • No Known Problems Sister    • COPD Mother         smoker   • No Known Problems Brother    • No Known Problems Maternal Grandmother    • No Known Problems Maternal Grandfather    • Uterine cancer Paternal Grandmother    • No Known Problems Paternal Grandfather    • Lung cancer Other    • No Known Problems Sister    • No Known Problems Sister    • Diabetes Paternal Uncle    • Bone cancer Paternal Aunt        Social history:  Social History   Substance Use Topics   • Smoking status: Current Every Day Smoker     Packs/day: 1.00     Years: 45.00     Types: Cigarettes     Start date: 2/12/1977     Last attempt to quit: 12/27/2012   • Smokeless tobacco: Not on file   • Alcohol use 10.8 oz/week     18 Cans of beer per week      Comment: Stopped APRIL 2018        Review of systems:    Not performed as he cannot answer anything there's no family present and I have reviewed the chart    Objective:  Vitals Ranges:   Temp:  [98.6 °F (37 °C)-105.9 °F (41.1 °C)] 100.3 °F (37.9 °C)  Heart Rate:  [] 117  Resp:  [40] 40  BP: (145-190)/() 161/76      Physical Exam:  He is obtunded.  He cannot answer questions orientation fund of knowledge attention span and concentration are recent remote memory pupils he does resist eye opening pupils 1-1/2 constricting to 1 eyes are conjugate face is symmetrical no other cranial nerves to be tested.  He does withdraw to pain in the upper extremities there is bradykinesia rigidity but no resting tremor overall tone appears equal she is not following any commands reflexes trace throughout symmetrical toes downgoing bilaterally sensation coordination station and gait completely impossible to test heart regular without murmur neck is moderately rigid without bruits extremities no clubbing cyanosis some mild peripheral edema in the feet heart regular without murmur.  Visual acuity cannot be performed    Results review:   I reviewed the patient's new clinical results.    Data review:  Lab Results (last 24 hours)     Procedure Component Value Units Date/Time    Magnesium [[id number]] Collected:  05/22/18 1547    Specimen:  Blood Updated:  05/22/18 1553    MRSA Screen Culture - Swab, Naris, Right [[id number]] Collected:  05/22/18 1547    Specimen:  Swab from Naris, Right Updated:  05/22/18 1547    Respiratory Panel, PCR - Swab, Nasopharynx [[id number]] Collected:  05/22/18 1547    Specimen:  Swab from Nasopharynx Updated:  05/22/18 1547    Ammonia [[id number]]  (Abnormal) Collected:  05/22/18 1224    Specimen:  Blood Updated:  05/22/18 1331     Ammonia 100 (H) umol/L     Lactic Acid, Plasma [[id number]]  (Abnormal) Collected:  05/22/18 1225    Specimen:  Blood Updated:  05/22/18 1316     Lactate 3.1 (C) mmol/L     Blood Culture - Blood,  [[id number]]  (Normal) Collected:  05/22/18 0055    Specimen:  Blood from Arm, Left Updated:  05/22/18 1300     Blood Culture No growth at less than 24 hours    POC Glucose Once [[id number]]  (Abnormal) Collected:  05/22/18 1206    Specimen:  Blood Updated:  05/22/18 1238     Glucose 160 (H) mg/dL     Narrative:       Meter: [id number] : [id number] Momo Kitty L INA    Blood Culture - Blood, [[id number]]  (Normal) Collected:  05/21/18 2330    Specimen:  Blood from Arm, Left Updated:  05/22/18 1145     Blood Culture No growth at less than 24 hours    Urine Drug Screen - Urine, Clean Catch [[id number]]  (Abnormal) Collected:  05/21/18 2342    Specimen:  Urine from Urine, Clean Catch Updated:  05/22/18 1030     Amphet/Methamphet, Screen Positive (A)     Barbiturates Screen, Urine Negative     Benzodiazepine Screen, Urine Negative     Cocaine Screen, Urine Negative     Opiate Screen Negative     THC, Screen, Urine Positive (A)     Methadone Screen, Urine Negative     Oxycodone Screen, Urine Negative    Narrative:       Negative Thresholds For Drugs Screened:     Amphetamines               500 ng/ml   Barbiturates               200 ng/ml   Benzodiazepines            100 ng/ml   Cocaine                    300 ng/ml   Methadone                  300 ng/ml   Opiates                    300 ng/ml   Oxycodone                  100 ng/ml   THC                        50 ng/ml    The Normal Value for all drugs tested is negative. This report includes final unconfirmed screening results to be used for medical treatment purposes only. Unconfirmed results must not be used for non-medical purposes such as employment or legal testing. Clinical consideration should be applied to any drug of abuse test, particulary when unconfirmed results are used.    POC Glucose Once [[id number]]  (Abnormal) Collected:  05/22/18 0720    Specimen:  Blood Updated:  05/22/18 0810     Glucose 142 (H) mg/dL     Narrative:       Meter: [id number] :  [id number] Momo BUCKLEY    Hemoglobin A1c [[id number]]  (Abnormal) Collected:  05/22/18 0306    Specimen:  Blood from Arm, Right Updated:  05/22/18 0513     Hemoglobin A1C 5.70 (H) %     Narrative:       Hemoglobin A1C Ranges:    Increased Risk for Diabetes  5.7% to 6.4%  Diabetes                     >= 6.5%  Diabetic Goal                < 7.0%    Basic Metabolic Panel [[id number]]  (Abnormal) Collected:  05/22/18 0306    Specimen:  Blood from Arm, Right Updated:  05/22/18 0458     Glucose 151 (H) mg/dL      BUN 12 mg/dL      Creatinine 1.00 mg/dL      Sodium 139 mmol/L      Potassium 3.3 (L) mmol/L      Chloride 102 mmol/L      CO2 20.8 (L) mmol/L      Calcium 8.6 mg/dL      eGFR Non African Amer 77 mL/min/1.73      BUN/Creatinine Ratio 12.0     Anion Gap 16.2 mmol/L     Narrative:       GFR Normal >60  Chronic Kidney Disease <60  Kidney Failure <15    CBC (No Diff) [[id number]]  (Abnormal) Collected:  05/22/18 0306    Specimen:  Blood from Arm, Right Updated:  05/22/18 0452     WBC 15.29 (H) 10*3/mm3      RBC 3.92 (L) 10*6/mm3      Hemoglobin 12.0 (L) g/dL      Hematocrit 37.5 (L) %      MCV 95.7 fL      MCH 30.6 pg      MCHC 32.0 (L) g/dL      RDW 13.3 %      RDW-SD 46.2 fl      MPV 10.5 fL      Platelets 162 10*3/mm3     Lactic Acid, Plasma [[id number]]  (Abnormal) Collected:  05/22/18 0350    Specimen:  Blood Updated:  05/22/18 0439     Lactate 2.5 (C) mmol/L     Blood Culture - Blood, [[id number]] Collected:  05/22/18 0411    Specimen:  Blood from Arm, Left Updated:  05/22/18 0411    Lactic Acid, Reflex Timer (This will reflex a repeat order 3-3:15 hours after ordered.) [[id number]] Collected:  05/21/18 2330    Specimen:  Blood Updated:  05/22/18 0304     Extra Tube Hold for add-ons.     Comment: Auto resulted.       POC Glucose Once [[id number]]  (Abnormal) Collected:  05/22/18 0156    Specimen:  Blood Updated:  05/22/18 0207     Glucose 185 (H) mg/dL     Narrative:       Meter: [id number] : [id number] Kala  "Tracy SANCHEZ    Procalcitonin [[id number]]  (Abnormal) Collected:  05/21/18 2330    Specimen:  Blood Updated:  05/22/18 0123     Procalcitonin 3.07 (C) ng/mL     Narrative:       As a Marker for Sepsis (Non-Neonates):   1. <0.5 ng/mL represents a low risk of severe sepsis and/or septic shock.  1. >2 ng/mL represents a high risk of severe sepsis and/or septic shock.    As a Marker for Lower Respiratory Tract Infections that require antibiotic therapy:  PCT on Admission     Antibiotic Therapy             6-12 Hrs later  > 0.5                Strongly Recommended            >0.25 - <0.5         Recommended  0.1 - 0.25           Discouraged                   Remeasure/reassess PCT  <0.1                 Strongly Discouraged          Remeasure/reassess PCT      As 28 day mortality risk marker: \"Change in Procalcitonin Result\" (> 80 % or <=80 %) if Day 0 (or Day 1) and Day 4 values are available. Refer to [email or web address]   Change in PCT <=80 %   A decrease of PCT levels below or equal to 80 % defines a positive change in PCT test result representing a higher risk for 28-day all-cause mortality of patients diagnosed with severe sepsis or septic shock.  Change in PCT > 80 %   A decrease of PCT levels of more than 80 % defines a negative change in PCT result representing a lower risk for 28-day all-cause mortality of patients diagnosed with severe sepsis or septic shock.                Troponin [[id number]]  (Normal) Collected:  05/21/18 2330    Specimen:  Blood Updated:  05/22/18 0108     Troponin T <0.010 ng/mL     Narrative:       Troponin T Reference Ranges:  Less than 0.03 ng/mL:    Negative for AMI  0.03 to 0.09 ng/mL:      Indeterminant for AMI  Greater than 0.09 ng/mL: Positive for AMI    BNP [[id number]]  (Abnormal) Collected:  05/21/18 2330    Specimen:  Blood Updated:  05/22/18 0108     proBNP 1,851.0 (H) pg/mL     Narrative:       Among patients with dyspnea, NT-proBNP is highly sensitive for the " detection of acute congestive heart failure. In addition NT-proBNP of <300 pg/ml effectively rules out acute congestive heart failure with 99% negative predictive value.    Ethanol [[id number]] Collected:  05/21/18 2330    Specimen:  Blood Updated:  05/22/18 0105     Ethanol <10 mg/dL      Ethanol % <0.010 %     Magnesium [[id number]]  (Normal) Collected:  05/21/18 2330    Specimen:  Blood Updated:  05/22/18 0105     Magnesium 2.0 mg/dL     [place] Draw [[id number]] Collected:  05/21/18 2330    Specimen:  Blood Updated:  05/22/18 0037    Narrative:       The following orders were created for panel order [place] Draw.  Procedure                               Abnormality         Status                     ---------                               -----------         ------                     Light Blue Top[[id number]]                                   Final result               Green Top (Gel)[[id number]]                                  Final result               Lavender Top[[id number]]                                     Final result               Gold Top - SST[[id number]]                                   Final result                 Please view results for these tests on the individual orders.    Light Blue Top [[id number]] Collected:  05/21/18 2330    Specimen:  Blood Updated:  05/22/18 0037     Extra Tube hold for add-on     Comment: Auto resulted       Green Top (Gel) [[id number]] Collected:  05/21/18 2330    Specimen:  Blood Updated:  05/22/18 0037     Extra Tube Hold for add-ons.     Comment: Auto resulted.       Lavender Top [[id number]] Collected:  05/21/18 2330    Specimen:  Blood Updated:  05/22/18 0037     Extra Tube hold for add-on     Comment: Auto resulted       Gold Top - SST [[id number]] Collected:  05/21/18 2330    Specimen:  Blood Updated:  05/22/18 0037     Extra Tube Hold for add-ons.     Comment: Auto resulted.       Comprehensive Metabolic Panel [[id number]]  (Abnormal) Collected:  05/21/18 2330     Specimen:  Blood Updated:  05/22/18 0001     Glucose 199 (H) mg/dL      BUN 13 mg/dL      Creatinine 1.14 mg/dL      Sodium 137 mmol/L      Potassium 3.4 (L) mmol/L      Chloride 98 mmol/L      CO2 22.4 mmol/L      Calcium 9.1 mg/dL      Total Protein 6.4 g/dL      Albumin 3.60 g/dL      ALT (SGPT) 9 U/L      AST (SGOT) 11 U/L      Alkaline Phosphatase 92 U/L      Total Bilirubin 0.5 mg/dL      eGFR Non African Amer 66 mL/min/1.73      Globulin 2.8 gm/dL      A/G Ratio 1.3 g/dL      BUN/Creatinine Ratio 11.4     Anion Gap 16.6 mmol/L     Lactic Acid, Plasma [[id number]]  (Abnormal) Collected:  05/21/18 2330    Specimen:  Blood Updated:  05/22/18 0000     Lactate 2.2 (C) mmol/L     D-dimer, Quantitative [[id number]]  (Abnormal) Collected:  05/21/18 2330    Specimen:  Blood Updated:  05/22/18 0000     D-Dimer, Quantitative 1.73 (H) MCGFEU/mL     Narrative:       The [facility] D-Dimer test used in conjunction with a clinical pretest probability (PTP) assessment model, has been approved by the FDA to rule out the presence of venous thromboembolism (VTE) in outpatients suspected of deep venous thrombosis (DVT) or pulmonary embolism (PE).     Protime-INR [[id number]]  (Abnormal) Collected:  05/21/18 2330    Specimen:  Blood Updated:  05/22/18 0000     Protime 15.2 (H) Seconds      INR 1.22 (H)    Urinalysis With / Microscopic If Indicated - Urine, Clean Catch [[id number]]  (Abnormal) Collected:  05/21/18 2342    Specimen:  Urine from Urine, Clean Catch Updated:  05/22/18 0000     Color, UA Yellow     Appearance, UA Clear     pH, UA 5.5     Specific Gravity, UA 1.022     Glucose,  mg/dL (2+) (A)     Ketones, UA 40 mg/dL (2+) (A)     Bilirubin, UA Negative     Blood, UA Trace (A)     Protein,  mg/dL (2+) (A)     Leuk Esterase, UA Negative     Nitrite, UA Negative     Urobilinogen, UA 2.0 E.U./dL (A)    Urinalysis, Microscopic Only - Urine, Clean Catch [[id number]] Collected:  05/21/18 2342    Specimen:  Urine from Urine,  Clean Catch Updated:  05/22/18 0000     RBC, UA 0-2 /HPF      WBC, UA 0-2 /HPF      Bacteria, UA None Seen /HPF      Squamous Epithelial Cells, UA 0-2 /HPF      Hyaline Casts, UA 0-2 /LPF      Methodology Automated Microscopy    Blood Gas, Arterial [[id number]]  (Abnormal) Collected:  05/21/18 2338    Specimen:  Arterial Blood Updated:  05/21/18 2340     Site Arterial: left radial     Tylor's Test Positive     pH, Arterial 7.475 (H) pH units      pCO2, Arterial 28.9 (L) mm Hg      pO2, Arterial 57.0 (L) mm Hg      HCO3, Arterial 21.3 (L) mmol/L      Base Excess, Arterial -1.5 (L) mmol/L      O2 Saturation Calculated 91.6 (L) %      Barometric Pressure for Blood Gas 749.7 mmHg      Modality Cannula     Flow Rate 3 lpm      Set [facility] Resp Rate 22    Narrative:       Meter: [id number] : [id number] Rachelle Gardner    CBC & Differential [[id number]] Collected:  05/21/18 2330    Specimen:  Blood Updated:  05/21/18 2339    Narrative:       The following orders were created for panel order CBC & Differential.  Procedure                               Abnormality         Status                     ---------                               -----------         ------                     CBC Auto Differential[[id number]]        Abnormal            Final result                 Please view results for these tests on the individual orders.    CBC Auto Differential [[id number]]  (Abnormal) Collected:  05/21/18 2330    Specimen:  Blood Updated:  05/21/18 2339     WBC 14.50 (H) 10*3/mm3      RBC 4.24 (L) 10*6/mm3      Hemoglobin 13.1 (L) g/dL      Hematocrit 39.7 (L) %      MCV 93.6 fL      MCH 30.9 pg      MCHC 33.0 g/dL      RDW 13.3 %      RDW-SD 45.5 fl      MPV 10.2 fL      Platelets 207 10*3/mm3      Neutrophil % 92.8 (H) %      Lymphocyte % 4.0 (L) %      Monocyte % 2.8 (L) %      Eosinophil % 0.1 (L) %      Basophil % 0.1 %      Immature Grans % 0.2 %      Neutrophils, Absolute 13.47 (H) 10*3/mm3      Lymphocytes, Absolute  0.58 (L) 10*3/mm3      Monocytes, Absolute 0.40 10*3/mm3      Eosinophils, Absolute 0.01 10*3/mm3      Basophils, Absolute 0.01 10*3/mm3      Immature Grans, Absolute 0.03 10*3/mm3            Imaging:  Imaging Results (last 24 hours)     Procedure Component Value Units Date/Time    CT Abdomen Pelvis With Contrast [[id number]] Collected:  05/22/18 0711     Updated:  05/22/18 0819    Narrative:       CT ANGIOGRAM CHEST WITH IV CONTRAST, CT ABDOMEN AND PELVIS WITH IV  CONTRAST     HISTORY: 58-year-old with lung cancer with shortness of air. Chills,  fever.     TECHNIQUE: CT angiogram of the chest includes axial imaging from the  thoracic inlet to upper abdomen with IV contrast.  This data was  reconstructed in coronal and sagittal planes. 3-D volume rendering was  performed.  CT abdomen and pelvis was performed with IV contrast.     COMPARISON: PET/CT 04/28/2017, CT neck, chest, abdomen and pelvis  04/13/2017.     FINDINGS:  CT ANGIOGRAM CHEST: Evaluation is limited by the degree of  motion-related artifact. No large or central pulmonary embolus is  evident. Evaluation of segmental and subsegmental branches is very  limited by motion-related artifact. Heart size is enlarged. There is  dense right lower lobe pulmonary consolidation and right lower lobe air  bronchograms are present. There is consolidation to lesser degree within  the posterior medial left lower lobe in the posterior right upper lobe.  Material is present along the posterior wall of the right mainstem  bronchus. Well-circumscribed partially calcified granuloma is present in  the posterior left lower lobe measuring 11 mm.  Enlarged node in the  left supraclavicular fossa measures 1.6 cm and measured 1.5 cm on the  previous CT of 04/13/2017.  There is no axillary magdaleno enlargement. A  precarinal lymph node measures 2.1 x 1.0 cm and this has increased from  1.7 x 0.8 cm. There is mild gynecomastia. Moderate right and small left  pleural effusions are  present. Bone windows demonstrate no osseous  lesion.        ABDOMEN/PELVIS: There is streak artifact overlying the upper abdomen  associated with patient scanned with his arms to sides. There is also  limitation due to motion-related artifact. Liver, spleen, adrenal  glands, pancreas appear within normal limits allowing for motion  artifact. Kidneys are within normal limits. Previous CT better  demonstrated linear soft tissue density in the region of the  gastrohepatic ligament and along the celiac axis and this is not clearly  visualized.  Small  retroperitoneal lymph nodes are without change.  There is no evidence for bowel obstruction or intra-abdominal abscess  formation. No magdaleno enlargement is evident within the pelvis. There is  Schmorl's node formation along the anterosuperior endplate of L3 with  chronic separation of its anterior superior corner and this is without  change compared with CT 04/13/2017.       Impression:       1. Dense right lower lobe consolidation consistent with infiltrate with  moderate right and small left pleural effusions. There is also  consolidation to a lesser degree within the posterior right upper lobe,  right lower lobe and left lower lobe. Mild pulmonary emphysema.  2. No evidence for pulmonary thromboembolic disease though evaluation of  the distal segmental and subsegmental branches is limited due to  motion-related artifact.  3. 1.6 cm left supraclavicular fossa node is slightly increased from 1.5  cm on the prior exam of 04/13/2017. There has also been mild increase in  size of precarinal lymph node.  4. No evidence for acute abnormality in the abdomen or pelvis or  significant change when compared to prior CT abdomen and pelvis  04/13/2017 allowing for motion limitation.        Radiation dose reduction techniques were utilized, including automated  exposure control and exposure modulation based on body size.     This report was finalized on 5/22/2018 8:16 AM by   Carlos Ahn M.D.       CT Angiogram Chest With Contrast [[id number]] Collected:  05/22/18 0711     Updated:  05/22/18 0819    Narrative:       CT ANGIOGRAM CHEST WITH IV CONTRAST, CT ABDOMEN AND PELVIS WITH IV  CONTRAST     HISTORY: 58-year-old with lung cancer with shortness of air. Chills,  fever.     TECHNIQUE: CT angiogram of the chest includes axial imaging from the  thoracic inlet to upper abdomen with IV contrast.  This data was  reconstructed in coronal and sagittal planes. 3-D volume rendering was  performed.  CT abdomen and pelvis was performed with IV contrast.     COMPARISON: PET/CT 04/28/2017, CT neck, chest, abdomen and pelvis  04/13/2017.     FINDINGS:  CT ANGIOGRAM CHEST: Evaluation is limited by the degree of  motion-related artifact. No large or central pulmonary embolus is  evident. Evaluation of segmental and subsegmental branches is very  limited by motion-related artifact. Heart size is enlarged. There is  dense right lower lobe pulmonary consolidation and right lower lobe air  bronchograms are present. There is consolidation to lesser degree within  the posterior medial left lower lobe in the posterior right upper lobe.  Material is present along the posterior wall of the right mainstem  bronchus. Well-circumscribed partially calcified granuloma is present in  the posterior left lower lobe measuring 11 mm.  Enlarged node in the  left supraclavicular fossa measures 1.6 cm and measured 1.5 cm on the  previous CT of 04/13/2017.  There is no axillary magdaleno enlargement. A  precarinal lymph node measures 2.1 x 1.0 cm and this has increased from  1.7 x 0.8 cm. There is mild gynecomastia. Moderate right and small left  pleural effusions are present. Bone windows demonstrate no osseous  lesion.        ABDOMEN/PELVIS: There is streak artifact overlying the upper abdomen  associated with patient scanned with his arms to sides. There is also  limitation due to motion-related artifact. Liver, spleen,  adrenal  glands, pancreas appear within normal limits allowing for motion  artifact. Kidneys are within normal limits. Previous CT better  demonstrated linear soft tissue density in the region of the  gastrohepatic ligament and along the celiac axis and this is not clearly  visualized.  Small  retroperitoneal lymph nodes are without change.  There is no evidence for bowel obstruction or intra-abdominal abscess  formation. No magdaleno enlargement is evident within the pelvis. There is  Schmorl's node formation along the anterosuperior endplate of L3 with  chronic separation of its anterior superior corner and this is without  change compared with CT 04/13/2017.       Impression:       1. Dense right lower lobe consolidation consistent with infiltrate with  moderate right and small left pleural effusions. There is also  consolidation to a lesser degree within the posterior right upper lobe,  right lower lobe and left lower lobe. Mild pulmonary emphysema.  2. No evidence for pulmonary thromboembolic disease though evaluation of  the distal segmental and subsegmental branches is limited due to  motion-related artifact.  3. 1.6 cm left supraclavicular fossa node is slightly increased from 1.5  cm on the prior exam of 04/13/2017. There has also been mild increase in  size of precarinal lymph node.  4. No evidence for acute abnormality in the abdomen or pelvis or  significant change when compared to prior CT abdomen and pelvis  04/13/2017 allowing for motion limitation.        Radiation dose reduction techniques were utilized, including automated  exposure control and exposure modulation based on body size.     This report was finalized on 5/22/2018 8:16 AM by Dr. Carlos Ahn M.D.       CT Head Without Contrast [[id number]] Collected:  05/22/18 0723     Updated:  05/22/18 0816    Narrative:       CT HEAD WITHOUT CONTRAST     HISTORY: Altered mental status. Fever.     TECHNIQUE: Head CT includes axial imaging from the  base of skull to  vertex without IV contrast.     COMPARISON: There is no previous head CT for comparison.     FINDINGS: There are no abnormal areas of increased attenuation  intra-axially to suggest hemorrhage. No extra-axial fluid collection is  observed. There is no evidence for cerebral edema or mass effect or  shift of midline structures. Ventricles appear within normal limits for  size and configuration. There is mild fluid within the right mastoid air  cells. There is mild frontal, maxillary, sphenoid and ethmoid sinus  mucosal thickening.       Impression:       1. No evidence for acute intracranial abnormality.  2. Partial opacification of right mastoid air cells with fluid.  3. Mild paranasal sinus mucoperiosteal thickening.     Radiation dose reduction techniques were utilized, including automated  exposure control and exposure modulation based on body size.     This report was finalized on 5/22/2018 8:13 AM by Dr. Carlos Ahn M.D.       XR Chest 1 View [[id number]] Collected:  05/22/18 0002     Updated:  05/22/18 0002    Narrative:       X-RAY CHEST 1 VIEW.     HISTORY: Shortness breath.     COMPARISON: 4/13/2017.     FINDINGS:  Cardiomediastinal silhouette is within normal limits.         New abnormal opacity of the right lung may be due to infiltrate.  Elevation of right hemidiaphragm. Lung volumes are low.              Impression:       Right lung infiltrates, follow-up to resolution is recommended.                    Assessment and Plan:     Active Problems:    RLL pneumonia    Quite a metabolic encephalopathy at this time.  He is very poorly responsive and is being treated appropriately with antibiotics in the form of vancomycin and Maxipime.  For now I have reviewed his CT of the head that shows some atrophy but no acute abnormality and I do not see evidence of an acute stroke or seizure syndrome at this time  Certainly, testing positive for methamphetamine, the hypoxia that he suffered, as  well as as the ammonia level of 100 would all contribute to this type of metabolic encephalopathy and unresponsiveness.   Obviously we are being aggressive regarding his fever with the antibiotic treatment and I will reassess tomorrow thanks I will continue to follow and will attempt to DC any neurotrophic medications that would not be absolutely necessary at this time.      Gabriel Parsons MD  05/22/18  4:10 PM

## 2018-05-23 LAB
ALBUMIN SERPL-MCNC: 2.5 G/DL (ref 3.5–5.2)
ALBUMIN/GLOB SERPL: 0.8 G/DL
ALP SERPL-CCNC: 83 U/L (ref 39–117)
ALT SERPL W P-5'-P-CCNC: 6 U/L (ref 1–41)
ANION GAP SERPL CALCULATED.3IONS-SCNC: 14.3 MMOL/L
AST SERPL-CCNC: 10 U/L (ref 1–40)
BASOPHILS # BLD AUTO: 0.02 10*3/MM3 (ref 0–0.2)
BASOPHILS NFR BLD AUTO: 0.1 % (ref 0–1.5)
BILIRUB SERPL-MCNC: 0.7 MG/DL (ref 0.1–1.2)
BUN BLD-MCNC: 13 MG/DL (ref 6–20)
BUN/CREAT SERPL: 14 (ref 7–25)
CALCIUM SPEC-SCNC: 8.7 MG/DL (ref 8.6–10.5)
CHLORIDE SERPL-SCNC: 106 MMOL/L (ref 98–107)
CO2 SERPL-SCNC: 21.7 MMOL/L (ref 22–29)
CREAT BLD-MCNC: 0.93 MG/DL (ref 0.76–1.27)
D-LACTATE SERPL-SCNC: 1.2 MMOL/L (ref 0.5–2)
D-LACTATE SERPL-SCNC: 1.7 MMOL/L (ref 0.5–2)
DEPRECATED RDW RBC AUTO: 47.1 FL (ref 37–54)
EOSINOPHIL # BLD AUTO: 0.01 10*3/MM3 (ref 0–0.7)
EOSINOPHIL NFR BLD AUTO: 0 % (ref 0.3–6.2)
ERYTHROCYTE [DISTWIDTH] IN BLOOD BY AUTOMATED COUNT: 13.5 % (ref 11.5–14.5)
GFR SERPL CREATININE-BSD FRML MDRD: 84 ML/MIN/1.73
GLOBULIN UR ELPH-MCNC: 3.1 GM/DL
GLUCOSE BLD-MCNC: 88 MG/DL (ref 65–99)
GLUCOSE BLDC GLUCOMTR-MCNC: 139 MG/DL (ref 70–130)
GLUCOSE BLDC GLUCOMTR-MCNC: 84 MG/DL (ref 70–130)
GLUCOSE BLDC GLUCOMTR-MCNC: 86 MG/DL (ref 70–130)
GLUCOSE BLDC GLUCOMTR-MCNC: 94 MG/DL (ref 70–130)
HCT VFR BLD AUTO: 34.9 % (ref 40.4–52.2)
HGB BLD-MCNC: 11.5 G/DL (ref 13.7–17.6)
IMM GRANULOCYTES # BLD: 0.06 10*3/MM3 (ref 0–0.03)
IMM GRANULOCYTES NFR BLD: 0.3 % (ref 0–0.5)
LYMPHOCYTES # BLD AUTO: 0.6 10*3/MM3 (ref 0.9–4.8)
LYMPHOCYTES NFR BLD AUTO: 2.7 % (ref 19.6–45.3)
MCH RBC QN AUTO: 31.3 PG (ref 27–32.7)
MCHC RBC AUTO-ENTMCNC: 33 G/DL (ref 32.6–36.4)
MCV RBC AUTO: 94.8 FL (ref 79.8–96.2)
MONOCYTES # BLD AUTO: 1.63 10*3/MM3 (ref 0.2–1.2)
MONOCYTES NFR BLD AUTO: 7.2 % (ref 5–12)
NEUTROPHILS # BLD AUTO: 20.25 10*3/MM3 (ref 1.9–8.1)
NEUTROPHILS NFR BLD AUTO: 89.7 % (ref 42.7–76)
PLATELET # BLD AUTO: 163 10*3/MM3 (ref 140–500)
PMV BLD AUTO: 10.3 FL (ref 6–12)
POTASSIUM BLD-SCNC: 4 MMOL/L (ref 3.5–5.2)
POTASSIUM BLD-SCNC: 5.1 MMOL/L (ref 3.5–5.2)
PROT SERPL-MCNC: 5.6 G/DL (ref 6–8.5)
RBC # BLD AUTO: 3.68 10*6/MM3 (ref 4.6–6)
SODIUM BLD-SCNC: 142 MMOL/L (ref 136–145)
WBC NRBC COR # BLD: 22.57 10*3/MM3 (ref 4.5–10.7)

## 2018-05-23 PROCEDURE — 99232 SBSQ HOSP IP/OBS MODERATE 35: CPT | Performed by: INTERNAL MEDICINE

## 2018-05-23 PROCEDURE — 25010000002 THIAMINE PER 100 MG: Performed by: PSYCHIATRY & NEUROLOGY

## 2018-05-23 PROCEDURE — 82962 GLUCOSE BLOOD TEST: CPT

## 2018-05-23 PROCEDURE — 83605 ASSAY OF LACTIC ACID: CPT | Performed by: INTERNAL MEDICINE

## 2018-05-23 PROCEDURE — 84132 ASSAY OF SERUM POTASSIUM: CPT | Performed by: INTERNAL MEDICINE

## 2018-05-23 PROCEDURE — 80053 COMPREHEN METABOLIC PANEL: CPT | Performed by: INTERNAL MEDICINE

## 2018-05-23 PROCEDURE — 94799 UNLISTED PULMONARY SVC/PX: CPT

## 2018-05-23 PROCEDURE — 99231 SBSQ HOSP IP/OBS SF/LOW 25: CPT | Performed by: PSYCHIATRY & NEUROLOGY

## 2018-05-23 PROCEDURE — 87040 BLOOD CULTURE FOR BACTERIA: CPT | Performed by: INTERNAL MEDICINE

## 2018-05-23 PROCEDURE — 25010000002 PIPERACILLIN SOD-TAZOBACTAM PER 1 G: Performed by: INTERNAL MEDICINE

## 2018-05-23 PROCEDURE — 85025 COMPLETE CBC W/AUTO DIFF WBC: CPT | Performed by: INTERNAL MEDICINE

## 2018-05-23 PROCEDURE — 25010000002 VANCOMYCIN 10 G RECONSTITUTED SOLUTION: Performed by: INTERNAL MEDICINE

## 2018-05-23 PROCEDURE — 25010000003 CEFTRIAXONE PER 250 MG: Performed by: INTERNAL MEDICINE

## 2018-05-23 RX ORDER — DIVALPROEX SODIUM 250 MG/1
250 TABLET, DELAYED RELEASE ORAL EVERY 12 HOURS SCHEDULED
Status: DISCONTINUED | OUTPATIENT
Start: 2018-05-23 | End: 2018-05-25 | Stop reason: HOSPADM

## 2018-05-23 RX ORDER — CEFTRIAXONE SODIUM 2 G/50ML
2 INJECTION, SOLUTION INTRAVENOUS EVERY 24 HOURS
Status: DISCONTINUED | OUTPATIENT
Start: 2018-05-23 | End: 2018-05-25 | Stop reason: HOSPADM

## 2018-05-23 RX ORDER — METOPROLOL SUCCINATE 50 MG/1
50 TABLET, EXTENDED RELEASE ORAL
Status: DISCONTINUED | OUTPATIENT
Start: 2018-05-24 | End: 2018-05-25 | Stop reason: HOSPADM

## 2018-05-23 RX ORDER — PANTOPRAZOLE SODIUM 40 MG/1
40 TABLET, DELAYED RELEASE ORAL EVERY MORNING
Status: DISCONTINUED | OUTPATIENT
Start: 2018-05-24 | End: 2018-05-25 | Stop reason: HOSPADM

## 2018-05-23 RX ORDER — SODIUM CHLORIDE, SODIUM LACTATE, POTASSIUM CHLORIDE, CALCIUM CHLORIDE 600; 310; 30; 20 MG/100ML; MG/100ML; MG/100ML; MG/100ML
75 INJECTION, SOLUTION INTRAVENOUS CONTINUOUS
Status: CANCELLED | OUTPATIENT
Start: 2018-05-23 | End: 2018-05-24

## 2018-05-23 RX ADMIN — ACETAMINOPHEN 400 MCG: 400 TABLET ORAL at 08:30

## 2018-05-23 RX ADMIN — VANCOMYCIN HYDROCHLORIDE 1250 MG: 10 INJECTION, POWDER, LYOPHILIZED, FOR SOLUTION INTRAVENOUS at 00:50

## 2018-05-23 RX ADMIN — TAZOBACTAM SODIUM AND PIPERACILLIN SODIUM 3.38 G: 375; 3 INJECTION, SOLUTION INTRAVENOUS at 03:05

## 2018-05-23 RX ADMIN — CEFTRIAXONE SODIUM 2 G: 2 INJECTION, SOLUTION INTRAVENOUS at 12:20

## 2018-05-23 RX ADMIN — ROSUVASTATIN CALCIUM 10 MG: 10 TABLET, FILM COATED ORAL at 08:30

## 2018-05-23 RX ADMIN — THIAMINE HYDROCHLORIDE 100 MG: 100 INJECTION, SOLUTION INTRAMUSCULAR; INTRAVENOUS at 08:57

## 2018-05-23 RX ADMIN — METOPROLOL TARTRATE 5 MG: 5 INJECTION, SOLUTION INTRAVENOUS at 03:05

## 2018-05-23 RX ADMIN — VALPROATE SODIUM 500 MG: 100 INJECTION, SOLUTION INTRAVENOUS at 03:05

## 2018-05-23 RX ADMIN — METOPROLOL TARTRATE 50 MG: 50 TABLET, FILM COATED ORAL at 08:31

## 2018-05-23 RX ADMIN — DIVALPROEX SODIUM 250 MG: 250 TABLET, DELAYED RELEASE ORAL at 22:26

## 2018-05-23 RX ADMIN — LISINOPRIL 40 MG: 40 TABLET ORAL at 08:31

## 2018-05-23 RX ADMIN — VALPROATE SODIUM 500 MG: 100 INJECTION, SOLUTION INTRAVENOUS at 15:45

## 2018-05-23 NOTE — PLAN OF CARE
Problem: Fall Risk (Adult)  Goal: Identify Related Risk Factors and Signs and Symptoms  Outcome: Outcome(s) achieved Date Met: 05/23/18    Goal: Absence of Fall  Outcome: Ongoing (interventions implemented as appropriate)      Problem: Patient Care Overview  Goal: Plan of Care Review  Outcome: Ongoing (interventions implemented as appropriate)   05/23/18 0455   Coping/Psychosocial   Plan of Care Reviewed With patient   OTHER   Outcome Summary pt less confused through the night, plan of care explained to pt, on 5L NC, VSS, no complaints of pain, CTM   Plan of Care Review   Progress improving       Problem: Confusion, Acute (Adult)  Goal: Identify Related Risk Factors and Signs and Symptoms  Outcome: Outcome(s) achieved Date Met: 05/23/18    Goal: Cognitive/Functional Impairments Minimized  Outcome: Ongoing (interventions implemented as appropriate)    Goal: Safety  Outcome: Ongoing (interventions implemented as appropriate)      Problem: Skin Injury Risk (Adult)  Goal: Identify Related Risk Factors and Signs and Symptoms  Outcome: Outcome(s) achieved Date Met: 05/23/18    Goal: Skin Health and Integrity  Outcome: Ongoing (interventions implemented as appropriate)

## 2018-05-23 NOTE — PROGRESS NOTES
LOS: 1 day   Patient Care Team:  ILIANA Parrish as PCP - General (Family Medicine)  Sharon Cheema MD as PCP - Family Medicine  Eren Leonardo MD as Consulting Physician (Hematology and Oncology)  Wally Sellers Jr., MD as Referring Physician (Internal Medicine)  Anish Hughes MD as Consulting Physician (Hematology and Oncology)    Subjective     Since awake and alert he says he is a little sore when he takes a deep breath on both sides of his lower chest.  No nausea on 3 L O2 he denies any shortness of breath he is not coughing much up he does not remember the last couple of days he says .    Review of Systems:   Not available       Objective     Vital Signs  Vital Sign Min/Max for last 24 hours  Temp  Min: 98.1 °F (36.7 °C)  Max: 100.3 °F (37.9 °C)   BP  Min: 108/60  Max: 165/90   Pulse  Min: 71  Max: 105   Resp  Min: 18  Max: 35   SpO2  Min: 80 %  Max: 98 %   Flow (L/min)  Min: 5  Max: 6   Weight  Min: 77.9 kg (171 lb 11.8 oz)  Max: 77.9 kg (171 lb 11.8 oz)        Ventilator/Non-Invasive Ventilation Settings     None                       Body mass index is 26.9 kg/m².  I/O last 3 completed shifts:  In: 4042 [I.V.:4042]  Out: 1300 [Urine:1300]  No intake/output data recorded.        Physical Exam:  General Appearance: We have a well-developed white male who is lying in bed with obvious obstructive apneas pretty severe.  He is on 6 L O2 with sats of 90-91%.  We get him upright some jaw thrust improved his oxygen I placed a nasal trumpet without difficulty and his left Morse and on 6 L his sats are now 95%.  Patient did wake up a little bit somewhat combative replace the nasal trumpet but not really following commands or answering questions.  Eyes: Conjunctiva are clear and anicteric pupils are pinpoint  ENT: He does have a Mallampati 3 Way mucous membranes are little dry  Neck: trachea is midline I don't appreciate jugular venous distention there is no palpable adenopathy or  thyromegaly  Lungs: Actually pretty clear only when he takes a really deep breath other a few crackles in the lung bases no wheezes .  Cardiac:  Regular rate and rhythm I don't appreciate a murmur or rub.  Abdomen: Soft I do not palpate hepatosplenomegaly or masses  : Not examined today  Musc/Skel: Grossly normal  Skin: No jaundice no petechiae no significant rashes noted  Neuro: He is alert and oriented ×3 he is following all commands with all extremities well.  Extremities/P Vascular: No clubbing or cyanosis he has palpable fairly strong radial and dorsalis pedis pulses bilaterally  MSE: He is somewhat of a contrarian       Labs:    Results from last 7 days  Lab Units 05/23/18  0603 05/23/18  0014 05/22/18  1547 05/22/18  0306 05/21/18  2330   GLUCOSE mg/dL 88  --   --  151* 199*   SODIUM mmol/L 142  --   --  139 137   POTASSIUM mmol/L 4.0 5.1  --  3.3* 3.4*   MAGNESIUM mg/dL  --   --  2.1  --  2.0   CO2 mmol/L 21.7*  --   --  20.8* 22.4   CHLORIDE mmol/L 106  --   --  102 98   ANION GAP mmol/L 14.3  --   --  16.2 16.6   CREATININE mg/dL 0.93  --   --  1.00 1.14   BUN mg/dL 13  --   --  12 13   BUN / CREAT RATIO  14.0  --   --  12.0 11.4   CALCIUM mg/dL 8.7  --   --  8.6 9.1   EGFR IF NONAFRICN AM mL/min/1.73 84  --   --  77 66   ALK PHOS U/L 83  --   --   --  92   TOTAL PROTEIN g/dL 5.6*  --   --   --  6.4   ALT (SGPT) U/L 6  --   --   --  9   AST (SGOT) U/L 10  --   --   --  11   BILIRUBIN mg/dL 0.7  --   --   --  0.5   ALBUMIN g/dL 2.50*  --   --   --  3.60   GLOBULIN gm/dL 3.1  --   --   --  2.8   A/G RATIO g/dL 0.8  --   --   --  1.3     Estimated Creatinine Clearance: 97.7 mL/min (by C-G formula based on SCr of 0.93 mg/dL).        Results from last 7 days  Lab Units 05/23/18  0603 05/22/18  0306 05/21/18  2330   WBC 10*3/mm3 22.57* 15.29* 14.50*   RBC 10*6/mm3 3.68* 3.92* 4.24*   HEMOGLOBIN g/dL 11.5* 12.0* 13.1*   HEMATOCRIT % 34.9* 37.5* 39.7*   MCV fL 94.8 95.7 93.6   MCH pg 31.3 30.6 30.9   MCHC g/dL  33.0 32.0* 33.0   RDW % 13.5 13.3 13.3   RDW-SD fl 47.1 46.2 45.5   MPV fL 10.3 10.5 10.2   PLATELETS 10*3/mm3 163 162 207   NEUTROPHIL % % 89.7*  --  92.8*   LYMPHOCYTE % % 2.7*  --  4.0*   MONOCYTES % % 7.2  --  2.8*   EOSINOPHIL % % 0.0*  --  0.1*   BASOPHIL % % 0.1  --  0.1   IMM GRAN % % 0.3  --  0.2   NEUTROS ABS 10*3/mm3 20.25*  --  13.47*   LYMPHS ABS 10*3/mm3 0.60*  --  0.58*   MONOS ABS 10*3/mm3 1.63*  --  0.40   EOS ABS 10*3/mm3 0.01  --  0.01   BASOS ABS 10*3/mm3 0.02  --  0.01   IMMATURE GRANS (ABS) 10*3/mm3 0.06*  --  0.03       Results from last 7 days  Lab Units 05/21/18  2338   PH, ARTERIAL pH units 7.475*   PO2 ART mm Hg 57.0*   PCO2, ARTERIAL mm Hg 28.9*   HCO3 ART mmol/L 21.3*       Results from last 7 days  Lab Units 05/21/18  2330   TROPONIN T ng/mL <0.010       Results from last 7 days  Lab Units 05/21/18  2330   PROBNP pg/mL 1,851.0*           Results from last 7 days  Lab Units 05/23/18  0603 05/23/18  0014 05/22/18  1816 05/22/18  1225 05/22/18  0350 05/21/18  2330   LACTATE mmol/L 1.7 1.2 1.8 3.1* 2.5* 2.2*   PROCALCITONIN ng/mL  --   --   --   --   --  3.07*       Results from last 7 days  Lab Units 05/21/18  2330   INR  1.22*     Microbiology Results (last 10 days)     Procedure Component Value - Date/Time    S. Pneumo Ag Urine or CSF - Urine, Urine, Clean Catch [[id number]]  (Normal) Collected:  05/22/18 1711    Lab Status:  Final result Specimen:  Urine from Urine, Catheter In/Out Updated:  05/22/18 1912     Strep Pneumo Ag Negative    Legionella Antigen, Urine - Urine, Urine, Clean Catch [[id number]]  (Normal) Collected:  05/22/18 1711    Lab Status:  Final result Specimen:  Urine from Urine, Catheter In/Out Updated:  05/22/18 1912     LEGIONELLA ANTIGEN, URINE Negative    MRSA Screen Culture - Swab, Naris, Right [[id number]]  (Normal) Collected:  05/22/18 1547    Lab Status:  Preliminary result Specimen:  Swab from Naris, Right Updated:  05/23/18 0905     MRSA SCREEN CX No Methicillin  Resistant Staphylococcus aureus Isolated at 24 hours    Respiratory Panel, PCR - Swab, Nasopharynx [[id number]]  (Normal) Collected:  05/22/18 1547    Lab Status:  Final result Specimen:  Swab from Nasopharynx Updated:  05/22/18 2013     ADENOVIRUS, PCR Not Detected     Coronavirus 229E Not Detected     Coronavirus HKU1 Not Detected     Coronavirus NL63 Not Detected     Coronavirus OC43 Not Detected     Human Metapneumovirus Not Detected     Human Rhinovirus/Enterovirus Not Detected     Influenza B PCR Not Detected     Parainfluenza Virus 1 Not Detected     Parainfluenza Virus 2 Not Detected     Parainfluenza Virus 3 Not Detected     Parainfluenza Virus 4 Not Detected     Bordetella pertussis pcr Not Detected     Influenza A H1 2009 PCR Not Detected     Chlamydophila pneumoniae PCR Not Detected     Mycoplasma pneumo by PCR Not Detected     Influenza A PCR Not Detected     Influenza A H3 Not Detected     Influenza A H1 Not Detected     RSV, PCR Not Detected    Blood Culture - Blood, [[id number]]  (Normal) Collected:  05/22/18 0411    Lab Status:  Preliminary result Specimen:  Blood from Arm, Left Updated:  05/23/18 0415     Blood Culture No growth at 24 hours    Blood Culture - Blood, [[id number]]  (Abnormal) Collected:  05/22/18 0055    Lab Status:  Preliminary result Specimen:  Blood from Arm, Left Updated:  05/23/18 0714     Blood Culture Streptococcus pneumoniae (A)     Isolated from Aerobic and Anaerobic Bottles     Gram Stain Result Anaerobic Bottle Gram positive cocci in pairs and chains      Aerobic Bottle Gram positive cocci in pairs and chains    Blood Culture - Blood, [[id number]]  (Abnormal) Collected:  05/21/18 2330    Lab Status:  Preliminary result Specimen:  Blood from Arm, Left Updated:  05/23/18 0713     Blood Culture Streptococcus pneumoniae (A)     Isolated from Aerobic and Anaerobic Bottles     Gram Stain Result Aerobic Bottle Gram positive cocci in pairs and chains      Anaerobic Bottle Gram  positive cocci in pairs and chains    Blood Culture ID, PCR - Blood, [[id number]]  (Abnormal) Collected:  05/21/18 2330    Lab Status:  Final result Specimen:  Blood from Arm, Left Updated:  05/22/18 2106     BCID, PCR Streptococcus pneumoniae. Identification by BCID PCR. (C)                ceftriaxone 2 g Intravenous Q24H   folic acid 400 mcg Oral Daily   lisinopril 40 mg Oral Daily   metoprolol tartrate 5 mg Intravenous Q6H   metoprolol tartrate 50 mg Oral Daily   risperiDONE 2 mg Oral Daily   rosuvastatin 10 mg Oral Daily   thiamine (VITAMIN B1) IVPB 100 mg Intravenous Daily   valproate sodium 500 mg Intravenous Q12H       dexmedetomidine 0.2-1.5 mcg/kg/hr Last Rate: Stopped (05/22/18 0852)   lactated ringers 125 mL/hr Last Rate: 125 mL/hr (05/22/18 1600)   sodium chloride 9 mL/hr Last Rate: 9 mL/hr (05/22/18 0400)       Diagnostics:  Ct Head Without Contrast    Result Date: 5/22/2018  CT HEAD WITHOUT CONTRAST  HISTORY: Altered mental status. Fever.  TECHNIQUE: Head CT includes axial imaging from the base of skull to vertex without IV contrast.  COMPARISON: There is no previous head CT for comparison.  FINDINGS: There are no abnormal areas of increased attenuation intra-axially to suggest hemorrhage. No extra-axial fluid collection is observed. There is no evidence for cerebral edema or mass effect or shift of midline structures. Ventricles appear within normal limits for size and configuration. There is mild fluid within the right mastoid air cells. There is mild frontal, maxillary, sphenoid and ethmoid sinus mucosal thickening.      1. No evidence for acute intracranial abnormality. 2. Partial opacification of right mastoid air cells with fluid. 3. Mild paranasal sinus mucoperiosteal thickening.  Radiation dose reduction techniques were utilized, including automated exposure control and exposure modulation based on body size.  This report was finalized on 5/22/2018 8:13 AM by Dr. Carlos Ahn M.D.      Ct  Abdomen Pelvis With Contrast    Result Date: 5/22/2018  CT ANGIOGRAM CHEST WITH IV CONTRAST, CT ABDOMEN AND PELVIS WITH IV CONTRAST  HISTORY: 58-year-old with lung cancer with shortness of air. Chills, fever.  TECHNIQUE: CT angiogram of the chest includes axial imaging from the thoracic inlet to upper abdomen with IV contrast.  This data was reconstructed in coronal and sagittal planes. 3-D volume rendering was performed.  CT abdomen and pelvis was performed with IV contrast.  COMPARISON: PET/CT 04/28/2017, CT neck, chest, abdomen and pelvis 04/13/2017.  FINDINGS: CT ANGIOGRAM CHEST: Evaluation is limited by the degree of motion-related artifact. No large or central pulmonary embolus is evident. Evaluation of segmental and subsegmental branches is very limited by motion-related artifact. Heart size is enlarged. There is dense right lower lobe pulmonary consolidation and right lower lobe air bronchograms are present. There is consolidation to lesser degree within the posterior medial left lower lobe in the posterior right upper lobe. Material is present along the posterior wall of the right mainstem bronchus. Well-circumscribed partially calcified granuloma is present in the posterior left lower lobe measuring 11 mm.  Enlarged node in the left supraclavicular fossa measures 1.6 cm and measured 1.5 cm on the previous CT of 04/13/2017.  There is no axillary magdaleno enlargement. A precarinal lymph node measures 2.1 x 1.0 cm and this has increased from 1.7 x 0.8 cm. There is mild gynecomastia. Moderate right and small left pleural effusions are present. Bone windows demonstrate no osseous lesion.   ABDOMEN/PELVIS: There is streak artifact overlying the upper abdomen associated with patient scanned with his arms to sides. There is also limitation due to motion-related artifact. Liver, spleen, adrenal glands, pancreas appear within normal limits allowing for motion artifact. Kidneys are within normal limits. Previous CT  better demonstrated linear soft tissue density in the region of the gastrohepatic ligament and along the celiac axis and this is not clearly visualized.  Small  retroperitoneal lymph nodes are without change. There is no evidence for bowel obstruction or intra-abdominal abscess formation. No magdaleno enlargement is evident within the pelvis. There is Schmorl's node formation along the anterosuperior endplate of L3 with chronic separation of its anterior superior corner and this is without change compared with CT 04/13/2017.      1. Dense right lower lobe consolidation consistent with infiltrate with moderate right and small left pleural effusions. There is also consolidation to a lesser degree within the posterior right upper lobe, right lower lobe and left lower lobe. Mild pulmonary emphysema. 2. No evidence for pulmonary thromboembolic disease though evaluation of the distal segmental and subsegmental branches is limited due to motion-related artifact. 3. 1.6 cm left supraclavicular fossa node is slightly increased from 1.5 cm on the prior exam of 04/13/2017. There has also been mild increase in size of precarinal lymph node. 4. No evidence for acute abnormality in the abdomen or pelvis or significant change when compared to prior CT abdomen and pelvis 04/13/2017 allowing for motion limitation.   Radiation dose reduction techniques were utilized, including automated exposure control and exposure modulation based on body size.  This report was finalized on 5/22/2018 8:16 AM by Dr. Carlos Ahn M.D.      Xr Chest 1 View    Result Date: 5/22/2018  X-RAY CHEST 1 VIEW.  HISTORY: Shortness breath.  COMPARISON: 4/13/2017.  FINDINGS: Cardiomediastinal silhouette is within normal limits.     New abnormal opacity of the right lung may be due to infiltrate. Elevation of right hemidiaphragm. Lung volumes are low.          Right lung infiltrates, follow-up to resolution is recommended.         Ct Angiogram Chest With  Contrast    Result Date: 5/22/2018  CT ANGIOGRAM CHEST WITH IV CONTRAST, CT ABDOMEN AND PELVIS WITH IV CONTRAST  HISTORY: 58-year-old with lung cancer with shortness of air. Chills, fever.  TECHNIQUE: CT angiogram of the chest includes axial imaging from the thoracic inlet to upper abdomen with IV contrast.  This data was reconstructed in coronal and sagittal planes. 3-D volume rendering was performed.  CT abdomen and pelvis was performed with IV contrast.  COMPARISON: PET/CT 04/28/2017, CT neck, chest, abdomen and pelvis 04/13/2017.  FINDINGS: CT ANGIOGRAM CHEST: Evaluation is limited by the degree of motion-related artifact. No large or central pulmonary embolus is evident. Evaluation of segmental and subsegmental branches is very limited by motion-related artifact. Heart size is enlarged. There is dense right lower lobe pulmonary consolidation and right lower lobe air bronchograms are present. There is consolidation to lesser degree within the posterior medial left lower lobe in the posterior right upper lobe. Material is present along the posterior wall of the right mainstem bronchus. Well-circumscribed partially calcified granuloma is present in the posterior left lower lobe measuring 11 mm.  Enlarged node in the left supraclavicular fossa measures 1.6 cm and measured 1.5 cm on the previous CT of 04/13/2017.  There is no axillary magdaleno enlargement. A precarinal lymph node measures 2.1 x 1.0 cm and this has increased from 1.7 x 0.8 cm. There is mild gynecomastia. Moderate right and small left pleural effusions are present. Bone windows demonstrate no osseous lesion.   ABDOMEN/PELVIS: There is streak artifact overlying the upper abdomen associated with patient scanned with his arms to sides. There is also limitation due to motion-related artifact. Liver, spleen, adrenal glands, pancreas appear within normal limits allowing for motion artifact. Kidneys are within normal limits. Previous CT better demonstrated  linear soft tissue density in the region of the gastrohepatic ligament and along the celiac axis and this is not clearly visualized.  Small  retroperitoneal lymph nodes are without change. There is no evidence for bowel obstruction or intra-abdominal abscess formation. No magdaleno enlargement is evident within the pelvis. There is Schmorl's node formation along the anterosuperior endplate of L3 with chronic separation of its anterior superior corner and this is without change compared with CT 04/13/2017.      1. Dense right lower lobe consolidation consistent with infiltrate with moderate right and small left pleural effusions. There is also consolidation to a lesser degree within the posterior right upper lobe, right lower lobe and left lower lobe. Mild pulmonary emphysema. 2. No evidence for pulmonary thromboembolic disease though evaluation of the distal segmental and subsegmental branches is limited due to motion-related artifact. 3. 1.6 cm left supraclavicular fossa node is slightly increased from 1.5 cm on the prior exam of 04/13/2017. There has also been mild increase in size of precarinal lymph node. 4. No evidence for acute abnormality in the abdomen or pelvis or significant change when compared to prior CT abdomen and pelvis 04/13/2017 allowing for motion limitation.   Radiation dose reduction techniques were utilized, including automated exposure control and exposure modulation based on body size.  This report was finalized on 5/22/2018 8:16 AM by Dr. Carlos Ahn M.D.           CT chest abdomen and pelvis CT chest does show bibasilar infiltrates right greater than left there are small bilateral pleural effusions bilaterally right is slightly greater than the left are some small mediastinal nodes are slightly enlarged but minimal change from 4/17    Active Hospital Problems (** Indicates Principal Problem)    Diagnosis Date Noted   • RLL pneumonia [J18.1] 05/22/2018      Resolved Hospital Problems     Diagnosis Date Noted Date Resolved   No resolved problems to display.         Assessment/Plan     1. Bilateral pneumonia bibasilar Cultures with bacteremic Streptococcus pneumonia and ID has D escalated to ceftriaxone recommend a 10 day antibiotic course and recommend transition to oral Levaquin once he is ready to DC home if he hasn't already completed antibiotics and they have repeated blood cultures to document clearance.  2. Respiratory failure improved continue to wean O2 as tolerated  3. Altered mental status   4. Treatment of mantle cell lymphoma apparently not been candidate for treatment due to substance abuse  5. Lactic acidosis resolved she related to sepsis or secondary to his metformin use.  6. Hypokalemia replaced  7. Hypertension resume home medications  8. Diabetes mellitus type 2 sliding scale insulin I don't want to add back his metformin her home medications quite yet  9. Obstructive sleep apnea has a history of it but his Pap noncompliant he very clearly has sleep apnea talked with him about using a Pap machine and he said he will never use one again.  10. History of alcohol abuse and substance abuse.  He had a drug screen positive for methamphetamine the wife says she's been with him and no chance that he took this he has been on metformin which showed fairly can give a false positive test.  11. Peripheral neuropathy related reportedly to medication  12. History left IJ DVT secondary to vein port  13. Dyslipidemia    Plan for disposition: he is doing much better we will transfer to a monitored unit today    Vishnu Marroquin MD  05/23/18  10:22 AM    Time: spent over 35 min on pt care today

## 2018-05-23 NOTE — PROGRESS NOTES
Patient Identification:  NAME:  Abdelrahman Murray  Age:  58 y.o.   Sex:  male   :  1960   MRN:  [id number]       Chief complaint: Metabolic encephalopathy    History of present illness:  Dramatically more awake and alert he denies headache paresthesias or paralysis is joking around a lot and, see below, is well oriented and basically without complaints today!      Past medical history:  Past Medical History:   Diagnosis Date   • Alcohol abuse    • Cancer     H/O Mantle cell lymphoma   • Clotting disorder     DVT left internal jugular vein port associated   • Diabetes mellitus    • GERD (gastroesophageal reflux disease)    • H/O Alcohol-induced pancreatitis    • H/O Ankle fracture    • H/O Peripheral neuropathy     when on vincristine and Velcade   • Hypertension    • Lymphoma        Allergies:  Adult aspirin ec [aspirin] and Crestor [rosuvastatin]    Home medications:  Prescriptions Prior to Admission   Medication Sig Dispense Refill Last Dose   • B Complex Vitamins (VITAMIN B COMPLEX) capsule capsule    Past Week at Unknown time   • divalproex (DEPAKOTE) 500 MG DR tablet    Past Week at Unknown time   • FLUoxetine (PROZAC) 40 MG capsule Take 2 capsules by mouth Daily. 180 capsule 3 Past Week at Unknown time   • folic acid (FOLVITE) 1 MG tablet    Past Week at Unknown time   • gabapentin (NEURONTIN) 300 MG capsule TAKE 1 CAPSULE THREE TIMES DAILY 270 capsule 1 Past Week at Unknown time   • hydrOXYzine (ATARAX) 25 MG tablet TAKE 1 TABLET THREE TIMES DAILY AS NEEDED FOR ANXIETY 90 tablet 1 2018 at Unknown time   • lisinopril (PRINIVIL,ZESTRIL) 40 MG tablet TAKE 1 TABLET EVERY DAY 90 tablet 0 Past Week at Unknown time   • metFORMIN (GLUCOPHAGE) 500 MG tablet Take 1 tablet by mouth Daily. 90 tablet 1 Past Week at Unknown time   • metoprolol tartrate (LOPRESSOR) 50 MG tablet Take 50 mg by mouth.   Past Week at Unknown time   • montelukast (SINGULAIR) 10 MG tablet Take 1 tablet by mouth Every Night. 90  tablet 0 5/22/2018 at Unknown time   • omeprazole (priLOSEC) 20 MG capsule Take 1 capsule by mouth Daily. 90 capsule 0 Past Week at Unknown time   • risperiDONE (risperDAL) 2 MG tablet    Past Week at Unknown time   • rosuvastatin (CRESTOR) 10 MG tablet Take 10 mg by mouth Daily.   Past Week at Unknown time   • acetaminophen (TYLENOL) 500 MG tablet Take 1 tablet by mouth Every 6 (Six) Hours As Needed for Mild Pain (1-3). 60 tablet 1 Taking   • acetaminophen-codeine (TYLENOL #3) 300-30 MG per tablet Take 1 tablet by mouth Daily As Needed for Moderate Pain . 30 tablet 0 Not Taking   • baclofen (LIORESAL) 10 MG tablet As Needed.   Not Taking   • metoprolol tartrate (LOPRESSOR) 50 MG tablet TAKE 1 TABLET EVERY DAY 90 tablet 0 Taking   • metoprolol tartrate (LOPRESSOR) 50 MG tablet TAKE 1 TABLET EVERY DAY 90 tablet 0         Hospital medications:    ceftriaxone 2 g Intravenous Q24H   folic acid 400 mcg Oral Daily   lisinopril 40 mg Oral Daily   [START ON 5/24/2018] metoprolol succinate XL 50 mg Oral Q24H   risperiDONE 2 mg Oral Daily   rosuvastatin 10 mg Oral Daily   thiamine (VITAMIN B1) IVPB 100 mg Intravenous Daily   valproate sodium 500 mg Intravenous Q12H       sodium chloride 9 mL/hr Last Rate: 9 mL/hr (05/22/18 0400)     •  acetaminophen **OR** acetaminophen  •  bisacodyl  •  bisacodyl  •  dextrose  •  dextrose  •  glucagon (human recombinant)  •  hydrALAZINE  •  hydrOXYzine  •  ipratropium-albuterol  •  magnesium sulfate **OR** magnesium sulfate **OR** magnesium sulfate  •  ondansetron **OR** ondansetron ODT **OR** ondansetron  •  pneumococcal polysaccharide 23-valent  •  potassium chloride **OR** potassium chloride **OR** potassium chloride  •  potassium chloride  •  sodium chloride  •  sodium chloride  •  Insert peripheral IV **AND** sodium chloride  •  Insert peripheral IV **AND** sodium chloride      Objective:  Vitals Ranges:   Temp:  [98.1 °F (36.7 °C)-100 °F (37.8 °C)] 100 °F (37.8 °C)  Heart Rate:   [] 80  Resp:  [18-35] 18  BP: (128-165)/(58-90) 156/80      Physical Exam:  Amazingly awake and alert today and oriented ×3 jokes around with me a lot normal cranial nerves II through VII neck is supple good motor times for extremities reflexes trace throughout symmetrical toes downgoing bilaterally    Results review:   I reviewed the patient's new clinical results.    Data review:  Lab Results (last 24 hours)     Procedure Component Value Units Date/Time    Blood Culture - Blood, [[id number]] Collected:  05/23/18 1143    Specimen:  Blood from Hand, Left Updated:  05/23/18 1148    POC Glucose Once [[id number]]  (Normal) Collected:  05/23/18 1133    Specimen:  Blood Updated:  05/23/18 1138     Glucose 84 mg/dL     Narrative:       Meter: [id number] : [id number] Hollis ANN    Blood Culture - Blood, [[id number]] Collected:  05/23/18 1044    Specimen:  Blood from Hand, Right Updated:  05/23/18 1118    MRSA Screen Culture - Swab, Naris, Right [[id number]]  (Normal) Collected:  05/22/18 1547    Specimen:  Swab from Naris, Right Updated:  05/23/18 0905     MRSA SCREEN CX No Methicillin Resistant Staphylococcus aureus Isolated at 24 hours    POC Glucose Once [[id number]]  (Normal) Collected:  05/23/18 0722    Specimen:  Blood Updated:  05/23/18 0724     Glucose 86 mg/dL     Narrative:       Meter: [id number] : [id number] Hollis ANN    Blood Culture - Blood, [[id number]]  (Abnormal) Collected:  05/22/18 0055    Specimen:  Blood from Arm, Left Updated:  05/23/18 0714     Blood Culture Streptococcus pneumoniae (A)     Isolated from Aerobic and Anaerobic Bottles     Gram Stain Result Anaerobic Bottle Gram positive cocci in pairs and chains      Aerobic Bottle Gram positive cocci in pairs and chains    Blood Culture - Blood, [[id number]]  (Abnormal) Collected:  05/21/18 2330    Specimen:  Blood from Arm, Left Updated:  05/23/18 0713     Blood Culture Streptococcus pneumoniae (A)     Isolated from Aerobic  and Anaerobic Bottles     Gram Stain Result Aerobic Bottle Gram positive cocci in pairs and chains      Anaerobic Bottle Gram positive cocci in pairs and chains    Comprehensive Metabolic Panel [[id number]]  (Abnormal) Collected:  05/23/18 0603    Specimen:  Blood Updated:  05/23/18 0701     Glucose 88 mg/dL      BUN 13 mg/dL      Creatinine 0.93 mg/dL      Sodium 142 mmol/L      Potassium 4.0 mmol/L      Chloride 106 mmol/L      CO2 21.7 (L) mmol/L      Calcium 8.7 mg/dL      Total Protein 5.6 (L) g/dL      Albumin 2.50 (L) g/dL      ALT (SGPT) 6 U/L      AST (SGOT) 10 U/L      Alkaline Phosphatase 83 U/L      Total Bilirubin 0.7 mg/dL      eGFR Non African Amer 84 mL/min/1.73      Globulin 3.1 gm/dL      A/G Ratio 0.8 g/dL      BUN/Creatinine Ratio 14.0     Anion Gap 14.3 mmol/L     Lactic Acid, Plasma [[id number]]  (Normal) Collected:  05/23/18 0603    Specimen:  Blood Updated:  05/23/18 0646     Lactate 1.7 mmol/L     CBC & Differential [[id number]] Collected:  05/23/18 0603    Specimen:  Blood Updated:  05/23/18 0643    Narrative:       The following orders were created for panel order CBC & Differential.  Procedure                               Abnormality         Status                     ---------                               -----------         ------                     CBC Auto Differential[[id number]]        Abnormal            Final result                 Please view results for these tests on the individual orders.    CBC Auto Differential [[id number]]  (Abnormal) Collected:  05/23/18 0603    Specimen:  Blood Updated:  05/23/18 0643     WBC 22.57 (H) 10*3/mm3      RBC 3.68 (L) 10*6/mm3      Hemoglobin 11.5 (L) g/dL      Hematocrit 34.9 (L) %      MCV 94.8 fL      MCH 31.3 pg      MCHC 33.0 g/dL      RDW 13.5 %      RDW-SD 47.1 fl      MPV 10.3 fL      Platelets 163 10*3/mm3      Neutrophil % 89.7 (H) %      Lymphocyte % 2.7 (L) %      Monocyte % 7.2 %      Eosinophil % 0.0 (L) %      Basophil % 0.1 %       Immature Grans % 0.3 %      Neutrophils, Absolute 20.25 (H) 10*3/mm3      Lymphocytes, Absolute 0.60 (L) 10*3/mm3      Monocytes, Absolute 1.63 (H) 10*3/mm3      Eosinophils, Absolute 0.01 10*3/mm3      Basophils, Absolute 0.02 10*3/mm3      Immature Grans, Absolute 0.06 (H) 10*3/mm3     Blood Culture - Blood, [[id number]]  (Normal) Collected:  05/22/18 0411    Specimen:  Blood from Arm, Left Updated:  05/23/18 0415     Blood Culture No growth at 24 hours    POC Glucose Once [[id number]]  (Normal) Collected:  05/23/18 0300    Specimen:  Blood Updated:  05/23/18 0302     Glucose 94 mg/dL     Narrative:       Meter: [id number] : [id number] Dweho    Potassium [[id number]]  (Normal) Collected:  05/23/18 0014    Specimen:  Blood Updated:  05/23/18 0054     Potassium 5.1 mmol/L     Lactic Acid, Plasma [[id number]]  (Normal) Collected:  05/23/18 0014    Specimen:  Blood Updated:  05/23/18 0052     Lactate 1.2 mmol/L     POC Glucose Once [[id number]]  (Normal) Collected:  05/22/18 2328    Specimen:  Blood Updated:  05/22/18 2337     Glucose 93 mg/dL     Narrative:       Meter: [id number] : [id number] Dweho    Blood Culture ID, PCR - Blood, [[id number]]  (Abnormal) Collected:  05/21/18 2330    Specimen:  Blood from Arm, Left Updated:  05/22/18 2106     BCID, PCR Streptococcus pneumoniae. Identification by BCID PCR. (C)    Respiratory Panel, PCR - Swab, Nasopharynx [[id number]]  (Normal) Collected:  05/22/18 1547    Specimen:  Swab from Nasopharynx Updated:  05/22/18 2013     ADENOVIRUS, PCR Not Detected     Coronavirus 229E Not Detected     Coronavirus HKU1 Not Detected     Coronavirus NL63 Not Detected     Coronavirus OC43 Not Detected     Human Metapneumovirus Not Detected     Human Rhinovirus/Enterovirus Not Detected     Influenza B PCR Not Detected     Parainfluenza Virus 1 Not Detected     Parainfluenza Virus 2 Not Detected     Parainfluenza Virus 3 Not Detected     Parainfluenza Virus 4  Not Detected     Bordetella pertussis pcr Not Detected     Influenza A H1 2009 PCR Not Detected     Chlamydophila pneumoniae PCR Not Detected     Mycoplasma pneumo by PCR Not Detected     Influenza A PCR Not Detected     Influenza A H3 Not Detected     Influenza A H1 Not Detected     RSV, PCR Not Detected    POC Glucose Once [[id number]]  (Normal) Collected:  05/22/18 1942    Specimen:  Blood Updated:  05/22/18 1943     Glucose 105 mg/dL     Narrative:       Meter: [id number] : [id number] Kelsieargentina Hui INA    S. Pneumo Ag Urine or CSF - Urine, Urine, Clean Catch [[id number]]  (Normal) Collected:  05/22/18 1711    Specimen:  Urine from Urine, Catheter In/Out Updated:  05/22/18 1912     Strep Pneumo Ag Negative    Legionella Antigen, Urine - Urine, Urine, Clean Catch [[id number]]  (Normal) Collected:  05/22/18 1711    Specimen:  Urine from Urine, Catheter In/Out Updated:  05/22/18 1912     LEGIONELLA ANTIGEN, URINE Negative    Lactic Acid, Plasma [[id number]]  (Normal) Collected:  05/22/18 1816    Specimen:  Blood Updated:  05/22/18 1838     Lactate 1.8 mmol/L     Valproic Acid Level, Total [[id number]]  (Normal) Collected:  05/22/18 1547    Specimen:  Blood Updated:  05/22/18 1700     Valproic Acid 55.0 mcg/mL     Magnesium [[id number]]  (Normal) Collected:  05/22/18 1547    Specimen:  Blood Updated:  05/22/18 1618     Magnesium 2.1 mg/dL            Imaging:  Imaging Results (last 24 hours)     Procedure Component Value Units Date/Time    XR Chest 1 View [[id number]] Collected:  05/22/18 0002     Updated:  05/23/18 0031    Narrative:       X-RAY CHEST 1 VIEW.     HISTORY: Shortness breath.     COMPARISON: 4/13/2017.     FINDINGS:  Cardiomediastinal silhouette is within normal limits.         New abnormal opacity of the right lung may be due to infiltrate.  Elevation of right hemidiaphragm. Lung volumes are low.              Impression:       Right lung infiltrates, follow-up to resolution is recommended.         This  report was finalized on 5/23/2018 12:28 AM by Dr. Andrzej Sultana M.D.                Assessment and Plan:     Active Problems:    RLL pneumonia  An amazing transformation as he is awake alert and oriented ×3 today!!  Metabolic encephalopathy much much better.  I Do not see indication for further neurologic workup at this time and I will sign off in follow-up.  Reconsult thanks      Gabriel Parsons MD  05/23/18  2:54 PM

## 2018-05-23 NOTE — PROGRESS NOTES
LOS: 1 day     Chief Complaint:  Follow-up RLL pneumo and Strep pneumo septicemia    Interval History:  No acute events. Still w/ some low-grade fevers. BCx growing Strep pneumo. UDS + for THC. He denies shortness of air. Still has some associated cough. Fever curve is improved.    ROS: no n/v/d    Vital Signs  Temp:  [98.1 °F (36.7 °C)-100.3 °F (37.9 °C)] 98.1 °F (36.7 °C)  Heart Rate:  [] 80  Resp:  [18-35] 18  BP: (108-165)/(58-90) 156/80  4L NC    Physical Exam:  General: awake and alert today  Head: Normocephalic,  thick beard  Eyes: no scleral icterus  ENT: MM dry, OP clear, no thrush. Poor dentition. NC in place  Neck: Supple  Cardiovascular: NR, RR, no murmurs, rubs, or gallops; no LE edema  Respiratory: Rales in RLL; + coughing; no wheezing  GI: Abdomen is soft, non-tender, non-distended  : no Vera  Musculoskeletal: no joint abnormalities, normal musculature  Skin: No rashes, lesions, or embolic phenomenon  Neurological: Alert and oriented x 3, moves all 4 extremities  Psychiatric: Normal mood and affect   Vasc: no cyanosis; PIV w/o erythema    Antibiotics:  •  cefTRIAXone (ROCEPHIN) IVPB 2 g, 2 g, Intravenous, Q24H, Chintan Alexandre MD    LABS:  CBC, BMP, micro reviewed today  Lab Results   Component Value Date    WBC 22.57 (H) 05/23/2018    HGB 11.5 (L) 05/23/2018    HCT 34.9 (L) 05/23/2018    MCV 94.8 05/23/2018     05/23/2018     Lab Results   Component Value Date    GLUCOSE 88 05/23/2018    BUN 13 05/23/2018    CREATININE 0.93 05/23/2018    EGFRIFNONA 84 05/23/2018    BCR 14.0 05/23/2018    CO2 21.7 (L) 05/23/2018    CALCIUM 8.7 05/23/2018    ALBUMIN 2.50 (L) 05/23/2018    LABIL2 0.8 05/23/2018    AST 10 05/23/2018    ALT 6 05/23/2018    CRP 1.62 (H) 07/24/2017     Procal 3  Strep pneumo Ag negative  Legionella Ag negative  UDS + THC and amphetamines  NH4 100    Microbiology:  5/22 BCx: Strep pneumo x 2  5/22 RVP: negative  5/23 BCx: ordered    Radiology (personally  reviewed):   No new imaging today    Assessment/Plan   1, Streptococcus pneumoniae septicemia secondary to RLL pneumonia  -stop vancomycin and Zosyn  -start ceftriaxone 2 g IV q24h; anticipate 10-day course; will change to oral levofloxacin if ready to DC prior to that time  -repeat BCx to document clearance  -continue supportive ICU care    2. Toxic metabolic encephalopathy  -improved today    3. Mantle cell lymphoma  -not on chemotherapy    Thank you for this consult. ID will follow. I discussed the antibiotic plan with PharmD and RN.

## 2018-05-23 NOTE — PROGRESS NOTES
Subjective   REASON FOR CONSULTATION: Patient with history of mantle cell lymphoma-no longer followed in our office since 5/17       History of Present Illness  patient is a 58-year-old male with long history of mantle cell lymphoma was treated in our office until May 2017 when he was lost to follow-up.  He was referred to [facility] and was seen last in there sometime last year but because   SUBSTANCE abuse was not felt to be a candidate for therapy there .  He has been following with his family doctor was last seen in the office earlier this month after recent discharge from [facility] for substance abuse detoxification.  He is admitted now with altered mental status was yelling and screaming earlier and now is unresponsive.  CAT scans done in the ER showed evidence of bilateral pneumonia more on the right than the left and also very small lymph nodes in the mediastinum and supraclavicular areas with minimal progression since his last CAT scans in April 2017.HEAD CT negtive     5/23  Awake alert  Strep pneumoni sepsis on ABx   Cbc stable   NH3 100 poss from depakote    He is following with [facility] for lymphoma  Nothing to add    Past Medical History, Past Surgical History, Social History, Family History have been reviewed and are without significant changes except as mentioned.    Review of Systems   A comprehensive 14 point review of systems was performed and was negative except as mentioned.    Medications:  The current medication list was reviewed in the EMR    ALLERGIES:    Allergies   Allergen Reactions   • Adult Aspirin Ec [Aspirin] GI Intolerance     Intolerance-causes stomach irritation   • Crestor [Rosuvastatin] Diarrhea and GI Intolerance       Objective      Vitals:    05/23/18 0803 05/23/18 0830 05/23/18 0833 05/23/18 0900   BP: 165/90  156/80    Pulse:  84  80   Resp:       Temp:       TempSrc:       SpO2:  92%  92%   Weight:       Height:         Current Status 5/3/2017   ECOG  score 1       Physical Exam  GENERAL:  Well-developed, well-nourished in no acute distress. On O2 awake and oriented  SKIN:  Warm, dry without rashes, purpura or petechiae.  EYES:  Pupils equal, round and small  EOMs intact.  Conjunctivae normal.  NOSE:  Septum midline.  No excoriations or nasal discharge.  MOUTH:  Tongue is well-papillated; no stomatitis or ulcers.  Lips normal.  NECK:  Supple with good range of motion; no thyromegaly or masses, no JVD.  LYMPHATICS:  No cervical, supraclavicular, axillary or inguinal adenopathy.  CHEST:  Lung bilateral course rales with bronchial breath sounds right base  CARDIAC:  Regular rate and rhythm without murmurs, rubs or gallops. Normal S1,S2.  ABDOMEN:  Soft, nontender with no hepatosplenomegaly or masses.  EXTREMITIES:  No clubbing, cyanosis or edema.  NEUROLOGICAL:  Cranial Nerves II-XII grossly intact.  No focal neurological deficits.          RECENT LABS:  Hematology WBC   Date Value Ref Range Status   05/23/2018 22.57 (H) 4.50 - 10.70 10*3/mm3 Final     RBC   Date Value Ref Range Status   05/23/2018 3.68 (L) 4.60 - 6.00 10*6/mm3 Final     Hemoglobin   Date Value Ref Range Status   05/23/2018 11.5 (L) 13.7 - 17.6 g/dL Final     Hematocrit   Date Value Ref Range Status   05/23/2018 34.9 (L) 40.4 - 52.2 % Final     Platelets   Date Value Ref Range Status   05/23/2018 163 140 - 500 10*3/mm3 Final       tox scree + meth and THC                                             Ammonia 100    Assessment/Plan   1 history of mantle cell lymphoma no longer and active therapy for his records last seen in [place] in 2017 last seen in our office in May 2017-CAT scans in the ER show very minimal progression of nodes in the mediastinum and supraclavicular area since April 2017     2.  History of substance abuse admitted with mental status change and obtundation?  Drug overdose-mantle cell lymphoma has a predilection for the CNS.  I suspect this is not the case-?depakote toxicity       .3.Streptococcal sepsis     4. History of DVT in the past port related      5. Type II DM       PLAN  Follow up with [facility]   Will not follow                     5/23/2018      CC:

## 2018-05-24 VITALS
BODY MASS INDEX: 26.96 KG/M2 | TEMPERATURE: 98.1 F | WEIGHT: 171.74 LBS | DIASTOLIC BLOOD PRESSURE: 81 MMHG | HEART RATE: 63 BPM | SYSTOLIC BLOOD PRESSURE: 172 MMHG | HEIGHT: 67 IN | RESPIRATION RATE: 18 BRPM | OXYGEN SATURATION: 92 %

## 2018-05-24 LAB
BACTERIA SPEC AEROBE CULT: ABNORMAL
BACTERIA SPEC AEROBE CULT: ABNORMAL
CREAT BLD-MCNC: 0.75 MG/DL (ref 0.76–1.27)
DEPRECATED RDW RBC AUTO: 46.2 FL (ref 37–54)
ERYTHROCYTE [DISTWIDTH] IN BLOOD BY AUTOMATED COUNT: 13.4 % (ref 11.5–14.5)
GFR SERPL CREATININE-BSD FRML MDRD: 108 ML/MIN/1.73
GLUCOSE BLDC GLUCOMTR-MCNC: 119 MG/DL (ref 70–130)
GLUCOSE BLDC GLUCOMTR-MCNC: 121 MG/DL (ref 70–130)
GLUCOSE BLDC GLUCOMTR-MCNC: 130 MG/DL (ref 70–130)
GLUCOSE BLDC GLUCOMTR-MCNC: 98 MG/DL (ref 70–130)
GRAM STN SPEC: ABNORMAL
HCT VFR BLD AUTO: 33.3 % (ref 40.4–52.2)
HGB BLD-MCNC: 10.7 G/DL (ref 13.7–17.6)
ISOLATED FROM: ABNORMAL
ISOLATED FROM: ABNORMAL
MCH RBC QN AUTO: 30.1 PG (ref 27–32.7)
MCHC RBC AUTO-ENTMCNC: 32.1 G/DL (ref 32.6–36.4)
MCV RBC AUTO: 93.8 FL (ref 79.8–96.2)
MRSA SPEC QL CULT: NORMAL
PLATELET # BLD AUTO: 200 10*3/MM3 (ref 140–500)
PMV BLD AUTO: 10.4 FL (ref 6–12)
RBC # BLD AUTO: 3.55 10*6/MM3 (ref 4.6–6)
WBC NRBC COR # BLD: 16.81 10*3/MM3 (ref 4.5–10.7)

## 2018-05-24 PROCEDURE — 82565 ASSAY OF CREATININE: CPT | Performed by: INTERNAL MEDICINE

## 2018-05-24 PROCEDURE — 85027 COMPLETE CBC AUTOMATED: CPT | Performed by: INTERNAL MEDICINE

## 2018-05-24 PROCEDURE — 82962 GLUCOSE BLOOD TEST: CPT

## 2018-05-24 PROCEDURE — 25010000003 CEFTRIAXONE PER 250 MG: Performed by: INTERNAL MEDICINE

## 2018-05-24 PROCEDURE — 99232 SBSQ HOSP IP/OBS MODERATE 35: CPT | Performed by: INTERNAL MEDICINE

## 2018-05-24 PROCEDURE — 25010000002 HYDRALAZINE PER 20 MG: Performed by: INTERNAL MEDICINE

## 2018-05-24 RX ORDER — HYDROXYZINE HYDROCHLORIDE 25 MG/1
TABLET, FILM COATED ORAL
Qty: 90 TABLET | Refills: 1 | Status: SHIPPED | OUTPATIENT
Start: 2018-05-24

## 2018-05-24 RX ADMIN — CEFTRIAXONE SODIUM 2 G: 2 INJECTION, SOLUTION INTRAVENOUS at 11:34

## 2018-05-24 RX ADMIN — ACETAMINOPHEN 650 MG: 325 TABLET, FILM COATED ORAL at 22:53

## 2018-05-24 RX ADMIN — METOPROLOL SUCCINATE 50 MG: 50 TABLET, FILM COATED, EXTENDED RELEASE ORAL at 08:45

## 2018-05-24 RX ADMIN — ACETAMINOPHEN 650 MG: 325 TABLET, FILM COATED ORAL at 16:40

## 2018-05-24 RX ADMIN — ROSUVASTATIN CALCIUM 10 MG: 10 TABLET, FILM COATED ORAL at 08:45

## 2018-05-24 RX ADMIN — DIVALPROEX SODIUM 250 MG: 250 TABLET, DELAYED RELEASE ORAL at 20:16

## 2018-05-24 RX ADMIN — PANTOPRAZOLE SODIUM 40 MG: 40 TABLET, DELAYED RELEASE ORAL at 08:48

## 2018-05-24 RX ADMIN — DIVALPROEX SODIUM 250 MG: 250 TABLET, DELAYED RELEASE ORAL at 08:45

## 2018-05-24 RX ADMIN — ACETAMINOPHEN 400 MCG: 400 TABLET ORAL at 08:45

## 2018-05-24 RX ADMIN — LISINOPRIL 40 MG: 40 TABLET ORAL at 08:45

## 2018-05-24 RX ADMIN — ACETAMINOPHEN 650 MG: 325 TABLET, FILM COATED ORAL at 04:23

## 2018-05-24 RX ADMIN — Medication 20 MG: at 22:53

## 2018-05-24 NOTE — PLAN OF CARE
Problem: Fall Risk (Adult)  Goal: Absence of Fall  Outcome: Ongoing (interventions implemented as appropriate)   05/24/18 1846   Fall Risk (Adult)   Absence of Fall making progress toward outcome       Problem: Patient Care Overview  Goal: Plan of Care Review  Outcome: Ongoing (interventions implemented as appropriate)   05/24/18 1846   Coping/Psychosocial   Plan of Care Reviewed With patient   OTHER   Outcome Summary Pt slept most of shift. Pt was incontinent of stool X1. VSS. Tylenol given X1. No new complaints. Will continue to monitor.    Plan of Care Review   Progress improving       Problem: Skin Injury Risk (Adult)  Goal: Skin Health and Integrity  Outcome: Ongoing (interventions implemented as appropriate)   05/24/18 1846   Skin Injury Risk (Adult)   Skin Health and Integrity making progress toward outcome

## 2018-05-24 NOTE — PROGRESS NOTES
LOS: 2 days   Patient Care Team:  ILIANA Parrish as PCP - General (Family Medicine)  Sharon Cheema MD as PCP - Family Medicine  Eren Leonardo MD as Consulting Physician (Hematology and Oncology)  Wally Sellers Jr., MD as Referring Physician (Internal Medicine)  Anish Hughes MD as Consulting Physician (Hematology and Oncology)      Subjective: Patient resting comfortably in bed lying on his right side.  He is very talkative today, stating how he can't believe that he ever started smoking and he will never  another cigarette again.  He states that he is able to breathe so much better, it's amazing how much better he feels in a short time.  He did talk about how awful shortness of breath was and that he is finally able to rest because he can breathe.  He does continue to be on oxygen at 2 L nasal cannula with saturations of 94%.  Has no complaints at this time.            Objective     Vital Signs  Temp:  [98.4 °F (36.9 °C)-100.2 °F (37.9 °C)] 99.1 °F (37.3 °C)  Heart Rate:  [65-80] 79  Resp:  [18] 18  BP: (144-177)/(70-89) 177/89  Body mass index is 26.9 kg/m².    Intake/Output Summary (Last 24 hours) at 05/24/18 0842  Last data filed at 05/24/18 0429   Gross per 24 hour   Intake                0 ml   Output              425 ml   Net             -425 ml     No intake/output data recorded.    Physical Exam:  General Appearance: Well-developed white male, resting comfortably in bed.  No signs or symptoms of acute distress noted.  Eyes: Conjunctiva are clear and anicteric pupils are 2mm and briskly reactive to light  ENT: He does have a Mallampati 3 airway.  Dry mucous membranes.  Neck: trachea is midline I don't appreciate jugular venous distention there is no palpable adenopathy or thyromegaly  Lungs: Clear bilaterally with symmetric chest expansion.  No wheezes, rales or rhonchi.  Cardiac:   sinus rhythm on cardiac monitor.  No M/G/R.  Abdomen: Bowel sounds positive ×4  quadrants.  Soft, nontender.  No palpable masses or organomegaly.  : Not examined today  Musc/Skel: Grossly normal  Skin: No jaundice no petechiae no significant rashes noted  Neuro: He is alert and oriented ×3 he is following all commands with all extremities well.  Extremities/P Vascular: No clubbing or cyanosis he has palpable fairly strong radial and dorsalis pedis pulses bilaterally.  No edema.  MSE: Actually very pleasant today.     Above noted on my exam patient is now off of oxygen saturating well on room air otherwise agree with above exam   Labs:  WBC No results found for: WBCS   HGB Hemoglobin   Date Value Ref Range Status   05/24/2018 10.7 (L) 13.7 - 17.6 g/dL Final   05/23/2018 11.5 (L) 13.7 - 17.6 g/dL Final   05/22/2018 12.0 (L) 13.7 - 17.6 g/dL Final   05/21/2018 13.1 (L) 13.7 - 17.6 g/dL Final      HCT Hematocrit   Date Value Ref Range Status   05/24/2018 33.3 (L) 40.4 - 52.2 % Final   05/23/2018 34.9 (L) 40.4 - 52.2 % Final   05/22/2018 37.5 (L) 40.4 - 52.2 % Final   05/21/2018 39.7 (L) 40.4 - 52.2 % Final      Platlets No results found for: LABPLAT     PT/INR:    Protime   Date Value Ref Range Status   05/21/2018 15.2 (H) 11.7 - 14.2 Seconds Final   /  INR   Date Value Ref Range Status   05/21/2018 1.22 (H) 0.90 - 1.10 Final       Sodium Sodium   Date Value Ref Range Status   05/23/2018 142 136 - 145 mmol/L Final   05/22/2018 139 136 - 145 mmol/L Final   05/21/2018 137 136 - 145 mmol/L Final      Potassium Potassium   Date Value Ref Range Status   05/23/2018 4.0 3.5 - 5.2 mmol/L Final   05/23/2018 5.1 3.5 - 5.2 mmol/L Final   05/22/2018 3.3 (L) 3.5 - 5.2 mmol/L Final   05/21/2018 3.4 (L) 3.5 - 5.2 mmol/L Final      Chloride Chloride   Date Value Ref Range Status   05/23/2018 106 98 - 107 mmol/L Final   05/22/2018 102 98 - 107 mmol/L Final   05/21/2018 98 98 - 107 mmol/L Final      Bicarbonate No results found for: PLASMABICARB   BUN BUN   Date Value Ref Range Status   05/23/2018 13 6 - 20 mg/dL  Final   05/22/2018 12 6 - 20 mg/dL Final   05/21/2018 13 6 - 20 mg/dL Final      Creatinine Creatinine   Date Value Ref Range Status   05/24/2018 0.75 (L) 0.76 - 1.27 mg/dL Final   05/23/2018 0.93 0.76 - 1.27 mg/dL Final   05/22/2018 1.00 0.76 - 1.27 mg/dL Final   05/21/2018 1.14 0.76 - 1.27 mg/dL Final      Calcium Calcium   Date Value Ref Range Status   05/23/2018 8.7 8.6 - 10.5 mg/dL Final   05/22/2018 8.6 8.6 - 10.5 mg/dL Final   05/21/2018 9.1 8.6 - 10.5 mg/dL Final      Magnesium Magnesium   Date Value Ref Range Status   05/22/2018 2.1 1.6 - 2.6 mg/dL Final   05/21/2018 2.0 1.6 - 2.6 mg/dL Final            pH pH, Arterial   Date Value Ref Range Status   05/21/2018 7.475 (H) 7.350 - 7.450 pH units Final      pO2 pO2, Arterial   Date Value Ref Range Status   05/21/2018 57.0 (L) 80.0 - 100.0 mm Hg Final      pCO2 pCO2, Arterial   Date Value Ref Range Status   05/21/2018 28.9 (L) 35.0 - 45.0 mm Hg Final      HCO3 HCO3, Arterial   Date Value Ref Range Status   05/21/2018 21.3 (L) 22.0 - 28.0 mmol/L Final          ceftriaxone 2 g Intravenous Q24H   divalproex 250 mg Oral Q12H   folic acid 400 mcg Oral Daily   lisinopril 40 mg Oral Daily   metoprolol succinate XL 50 mg Oral Q24H   pantoprazole 40 mg Oral QAM   rosuvastatin 10 mg Oral Daily          Diagnostics:  Imaging Results (last 24 hours)     ** No results found for the last 24 hours. **                  Assessment/Plan     Patient Active Problem List   Diagnosis Code   • Mantle cell lymphoma of lymph nodes of multiple sites C83.18   • Hypogammaglobulinemia D80.1   • Tobacco abuse Z72.0   • Hypertension I10   • Diabetes mellitus E11.9   • Anxiety and depression F41.8   • Gastroesophageal reflux disease K21.9   • Thyroid nodule E04.1   • RLL pneumonia J18.1        1. Bilateral pneumonia bibasilar Cultures with bacteremic Streptococcus pneumonia and ID has descalated to ceftriaxone recommend a 10 day antibiotic course and recommend transition to oral Levaquin once  he is ready to DC home if he hasn't already completed antibiotics and they have repeated blood cultures to document clearance.  2. Respiratory failure - Improving.  Currently on 2 L nasal cannula with saturation of 94%.  Continue to wean O2 as patient tolerates to keep saturation greater than or equal to 89%.  3. Altered mental status - seems to have resolved.  4. Treatment of mantle cell lymphoma apparently not been candidate for treatment due to substance abuse  5. Lactic acidosis -  Resolved.  Likely related to sepsis, or secondary to his metformin use.  6. Hypokalemia - resolved  7. Hypertension - Resolved since restarting home medication.  8. Diabetes mellitus type 2 sliding scale insulin I don't want to add back his metformin her home medications quite yet  9. Obstructive sleep apnea has a history of it but his Pap noncompliant he very clearly has sleep apnea talked with him about using a Pap machine and he said he will never use one again. We'll try and address again prior to discharge.  10. History of alcohol abuse and substance abuse.  He had a drug screen positive for methamphetamine the wife says she's been with him and no chance that he took this he has been on metformin which showed fairly can give a false positive test.  11. Peripheral neuropathy related reportedly to medication  12. History left IJ DVT secondary to vein port  13. Dyslipidemia      Plan for disposition: Patient may be close to going home possibly even tomorrow if his rapid improvement continues as per infectious disease we would complete a 10 day course of antibiotics with by mouth levofloxacin 750 daily      Patient seen and examined the above note reviewed, modified and verified by myself BESSIE Marroquin, ILIANA  05/24/18  8:42 AM    Time:

## 2018-05-24 NOTE — PROGRESS NOTES
LOS: 2 days     Chief Complaint:  Follow-up RLL pneumo and Strep pneumo septicemia    Interval History:  No acute events. Out of ICU. Breathing more comfortably today on 1.5L NC. Less cough. He is pleased w/ his recovery. He swears he is never smoking again.    ROS: no n/v/d    Vital Signs  Temp:  [98.4 °F (36.9 °C)-100.2 °F (37.9 °C)] 99.1 °F (37.3 °C)  Heart Rate:  [65-80] 79  Resp:  [18] 18  BP: (144-177)/(70-89) 177/89  4L NC    Physical Exam:  General: awake and alert today  Head: Normocephalic,  thick beard  Eyes: no scleral icterus  ENT: MM dry, OP clear, no thrush. Poor dentition. NC in place  Neck: Supple  Cardiovascular: NR, RR, no murmurs, rubs, or gallops; no LE edema  Respiratory: Rales in RLL; + coughing; no wheezing; 1.5L NC  GI: Abdomen is soft, non-tender, non-distended  : no Vera  Musculoskeletal: no joint abnormalities, normal musculature  Skin: No rashes, lesions, or embolic phenomenon  Neurological: Alert and oriented x 3, moves all 4 extremities  Psychiatric: Normal mood and affect   Vasc: no cyanosis; PIV w/o erythema    Antibiotics:  •  cefTRIAXone (ROCEPHIN) IVPB 2 g, 2 g, Intravenous, Q24H, Chintan Alexandre MD    LABS:  CBC, BMP, micro reviewed today  Lab Results   Component Value Date    WBC 16.81 (H) 05/24/2018    HGB 10.7 (L) 05/24/2018    HCT 33.3 (L) 05/24/2018    MCV 93.8 05/24/2018     05/24/2018     Lab Results   Component Value Date    GLUCOSE 88 05/23/2018    BUN 13 05/23/2018    CREATININE 0.75 (L) 05/24/2018    EGFRIFNONA 108 05/24/2018    BCR 14.0 05/23/2018    CO2 21.7 (L) 05/23/2018    CALCIUM 8.7 05/23/2018    ALBUMIN 2.50 (L) 05/23/2018    LABIL2 0.8 05/23/2018    AST 10 05/23/2018    ALT 6 05/23/2018    CRP 1.62 (H) 07/24/2017     Procal 3 at admission  Strep pneumo Ag negative  Legionella Ag negative  UDS + THC and amphetamines  NH4 100    Microbiology:  5/22 BCx: Strep pneumo x 2  5/22 RVP: negative  5/23 BCx: NGTD    Radiology (personally reviewed):    No new imaging today    Assessment/Plan   1, Streptococcus pneumoniae septicemia secondary to RLL pneumonia  -continue ceftriaxone 2 g IV q24h; anticipate 10-day course (STOP DATE 6/1/18); will change to oral levofloxacin 750 mg PO q24h if ready to DC prior to that time  -repeat BCx to document clearance from 5/23 are NGTD    2. Toxic metabolic encephalopathy  -resolved    3. Mantle cell lymphoma  -not on chemotherapy    4. Polysubstance abuse  -specifically encouraged tobacco cessation    Thank you for this consult. ID will follow.

## 2018-05-24 NOTE — PLAN OF CARE
Problem: Fall Risk (Adult)  Goal: Absence of Fall  Outcome: Ongoing (interventions implemented as appropriate)      Problem: Patient Care Overview  Goal: Plan of Care Review  Outcome: Ongoing (interventions implemented as appropriate)   05/24/18 4337   Coping/Psychosocial   Plan of Care Reviewed With patient   OTHER   Outcome Summary VSS. Pt transfered. Originally admitted for PNA. Pt a/ox4. Pleasant but gets agitated at times. Pt able to call out for urinal. No skin issues. No new complaints. Tylenol given x1. Will CTM   Plan of Care Review   Progress improving     Goal: Discharge Needs Assessment  Outcome: Ongoing (interventions implemented as appropriate)      Problem: Skin Injury Risk (Adult)  Goal: Skin Health and Integrity  Outcome: Ongoing (interventions implemented as appropriate)

## 2018-05-24 NOTE — CONSULTS
Pt declined AC eval.  He states he knows all about drugs, alcohol, quit alcohol.  He denies SI/HI now or past.  AC will not follow.  Chart reviewed.  Wife has said (via chart) that she is not concerned about drug use, ETOH.      D/w Britany SANCHEZ.  Call [phone] if needed for AC, would need new consult order if want evaluation done.

## 2018-05-25 ENCOUNTER — TELEPHONE (OUTPATIENT)
Dept: FAMILY MEDICINE CLINIC | Facility: CLINIC | Age: 58
End: 2018-05-25

## 2018-05-25 RX ORDER — LEVOFLOXACIN 750 MG/1
750 TABLET ORAL DAILY
Qty: 7 TABLET | Refills: 0 | Status: SHIPPED | OUTPATIENT
Start: 2018-05-25 | End: 2018-06-15 | Stop reason: SDUPTHER

## 2018-05-25 NOTE — DISCHARGE SUMMARY
Discharge/AMA note  Patient left AMA not even consult with the hospital staff about leaving.  Security cameras can't him walking away from the hospital nursing apparently contacted his wife and found out he was at home and was unwilling to return to the hospital.    Diagnoses:  1. Bilateral pneumococcal pneumonia with bacteremic sepsis  2. Acute hypoxic respiratory failure  3. Metabolic encephalopathy  4. Mantle cell lymphoma  5. Lactic acidosis  6. Hypokalemia  7. Hypertension  8. Diabetes mellitus type 2  9. Obstructive sleep apnea  10. Peripheral neuropathy  11. Dyslipidemia  12. History of alcohol and substance abuse drug screen positive for methamphetamines but patient was on metformin which can give a false positive    Hospital course patient presented critically ill in the intensive care unit with severe sepsis bilateral pneumonia cultures subsequently revealing bacteremic pneumococcal sepsis with this he had respiratory failure markedly altered mental status he has a history of mantle cell lymphoma and he is a diabetic.  Patient was seen by multiple consultants and he had fairly dramatic improvement.  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

## 2018-05-25 NOTE — PROGRESS NOTES
Case Management Discharge Note    Final Note: Pt left AMA    Destination     No service coordination in this encounter.      Durable Medical Equipment     No service coordination in this encounter.      Dialysis/Infusion     No service coordination in this encounter.      Home Medical Care     No service coordination in this encounter.      Social Care     No service coordination in this encounter.             Final Discharge Disposition Code: 07 - left AMA

## 2018-05-25 NOTE — TELEPHONE ENCOUNTER
He is feeling better, no fever, coughing up phlegm. He was suppose to stay on antibiotics until June 1. He was going to get pout on levaquin 750mg but he left prior to leaving .

## 2018-05-25 NOTE — TELEPHONE ENCOUNTER
Pt was in the hospital but he left ama and they won't give him any meds since he left. Wants to know if you can rx them. He had pneumonia.

## 2018-05-25 NOTE — NURSING NOTE
"Spoke with wife. Stated she is home with her  and he is not willing to come back. She inquired about coming in tomorrow to  his \"scripts\". I informed her that would not be possible and he should follow up with his primary care provider.  notified.  "

## 2018-05-25 NOTE — NURSING NOTE
Patient was seen leaving the floor at 2320. Attempted to find patient with no success. Patient was seen on security camera going northeast on [facility] notified.  Laura notified.

## 2018-05-25 NOTE — TELEPHONE ENCOUNTER
I have erx levaquin to the [facility] x 1 wk, please make hospital  apt to review next week

## 2018-05-25 NOTE — NURSING NOTE
Spoke with wife Allegra at [phone]. She had not heard from her . She stated he did not have a cellular device. She stated she would look for him and call with any new leads.

## 2018-05-27 LAB — BACTERIA SPEC AEROBE CULT: NORMAL

## 2018-05-28 LAB
BACTERIA SPEC AEROBE CULT: NORMAL
BACTERIA SPEC AEROBE CULT: NORMAL

## 2018-05-29 ENCOUNTER — OFFICE VISIT (OUTPATIENT)
Dept: FAMILY MEDICINE CLINIC | Facility: CLINIC | Age: 58
End: 2018-05-29

## 2018-05-29 VITALS
OXYGEN SATURATION: 95 % | TEMPERATURE: 98.4 F | SYSTOLIC BLOOD PRESSURE: 128 MMHG | HEIGHT: 67 IN | WEIGHT: 165 LBS | DIASTOLIC BLOOD PRESSURE: 60 MMHG | HEART RATE: 90 BPM | BODY MASS INDEX: 25.9 KG/M2

## 2018-05-29 DIAGNOSIS — Z72.0 TOBACCO ABUSE: ICD-10-CM

## 2018-05-29 DIAGNOSIS — J13 PNEUMONIA OF BOTH LUNGS DUE TO STREPTOCOCCUS PNEUMONIAE, UNSPECIFIED PART OF LUNG (HCC): Primary | ICD-10-CM

## 2018-05-29 DIAGNOSIS — C83.18 MANTLE CELL LYMPHOMA OF LYMPH NODES OF MULTIPLE SITES (HCC): ICD-10-CM

## 2018-05-29 DIAGNOSIS — F19.10 SUBSTANCE ABUSE (HCC): ICD-10-CM

## 2018-05-29 LAB
ALBUMIN SERPL-MCNC: 2.8 G/DL (ref 3.5–5.2)
ALBUMIN/GLOB SERPL: 0.9 G/DL
ALP SERPL-CCNC: 118 U/L (ref 39–117)
ALT SERPL W P-5'-P-CCNC: 21 U/L (ref 1–41)
ANION GAP SERPL CALCULATED.3IONS-SCNC: 12.6 MMOL/L
AST SERPL-CCNC: 17 U/L (ref 1–40)
BILIRUB SERPL-MCNC: 0.3 MG/DL (ref 0.1–1.2)
BUN BLD-MCNC: 8 MG/DL (ref 6–20)
BUN/CREAT SERPL: 8.2 (ref 7–25)
CALCIUM SPEC-SCNC: 8.9 MG/DL (ref 8.6–10.5)
CHLORIDE SERPL-SCNC: 101 MMOL/L (ref 98–107)
CO2 SERPL-SCNC: 27.4 MMOL/L (ref 22–29)
CREAT BLD-MCNC: 0.98 MG/DL (ref 0.76–1.27)
ERYTHROCYTE [DISTWIDTH] IN BLOOD BY AUTOMATED COUNT: 13.2 % (ref 4.5–15)
GFR SERPL CREATININE-BSD FRML MDRD: 79 ML/MIN/1.73
GLOBULIN UR ELPH-MCNC: 3 GM/DL
GLUCOSE BLD-MCNC: 154 MG/DL (ref 65–99)
HCT VFR BLD AUTO: 31.6 % (ref 35–60)
HGB BLD-MCNC: 10.6 G/DL (ref 13.5–18)
LYMPHOCYTES # BLD AUTO: 1.1 10*3/MM3 (ref 1.2–3.4)
LYMPHOCYTES NFR BLD AUTO: 10.8 % (ref 21–51)
MCH RBC QN AUTO: 30.1 PG (ref 26.1–33.1)
MCHC RBC AUTO-ENTMCNC: 33.6 G/DL (ref 33–37)
MCV RBC AUTO: 89.6 FL (ref 80–99)
MONOCYTES # BLD AUTO: 0.7 10*3/MM3 (ref 0.1–0.6)
MONOCYTES NFR BLD AUTO: 7.1 % (ref 2–9)
NEUTROPHILS # BLD AUTO: 8.6 10*3/MM3 (ref 1.4–6.5)
NEUTROPHILS NFR BLD AUTO: 82.1 % (ref 42–75)
PLATELET # BLD AUTO: 454 10*3/MM3 (ref 150–450)
PMV BLD AUTO: 7.3 FL (ref 7.1–10.5)
POTASSIUM BLD-SCNC: 4.1 MMOL/L (ref 3.5–5.2)
PROT SERPL-MCNC: 5.8 G/DL (ref 6–8.5)
RBC # BLD AUTO: 3.53 10*6/MM3 (ref 4–6)
SODIUM BLD-SCNC: 141 MMOL/L (ref 136–145)
WBC NRBC COR # BLD: 10.5 10*3/MM3 (ref 4.5–10)

## 2018-05-29 PROCEDURE — 99213 OFFICE O/P EST LOW 20 MIN: CPT | Performed by: NURSE PRACTITIONER

## 2018-05-29 PROCEDURE — 80053 COMPREHEN METABOLIC PANEL: CPT | Performed by: NURSE PRACTITIONER

## 2018-05-29 PROCEDURE — 36415 COLL VENOUS BLD VENIPUNCTURE: CPT | Performed by: NURSE PRACTITIONER

## 2018-05-29 PROCEDURE — 85025 COMPLETE CBC W/AUTO DIFF WBC: CPT | Performed by: NURSE PRACTITIONER

## 2018-05-29 NOTE — PROGRESS NOTES
Subjective   Abdelrahman Murray is a 58 y.o. male.     History of Present Illness   Here to FU on recent [facility] hospitalization 05/21/18-05/25/18 for acute respiratory distress and altered mental status beginning 05/20/18, was seen in ICU B pneumonia and bacteremia, with mantle cell lymphoma, left AMA d/t poor nursing staff and walking 1 mile towards home before wife found him, called here to continue abx therapy outpatient now on levaquin 750 mg 1 PO daily x 1 wk, no longer drinking alcohol x 1 mo but smokes pot with positive drug screen and meth but denies recent meth, with CT abd and pelvis showing pna and sl enlarged lymph node L subclavicular, sees [place] for lymphoma and states recent screening 1 mo ago normal wasn't eligible for treatment until stopped drinking and plans to FU with them in 4 mo    The following portions of the patient's history were reviewed and updated as appropriate: allergies, current medications, past family history, past medical history, past social history, past surgical history and problem list.    Review of Systems   Constitutional: Positive for appetite change and fatigue. Negative for activity change, chills, diaphoresis, fever and unexpected weight change.   HENT: Negative for congestion, dental problem, drooling, ear discharge, ear pain, facial swelling, hearing loss, mouth sores, nosebleeds, postnasal drip, rhinorrhea, sinus pain, sinus pressure, sneezing, sore throat, tinnitus, trouble swallowing and voice change.    Respiratory: Negative for cough, chest tightness, shortness of breath and wheezing.    Cardiovascular: Negative for chest pain, palpitations and leg swelling.   Musculoskeletal: Negative for arthralgias, back pain, gait problem, joint swelling, myalgias, neck pain and neck stiffness.   Neurological: Positive for weakness. Negative for dizziness and headaches.   Psychiatric/Behavioral: Negative for agitation, behavioral problems, confusion, decreased  concentration, dysphoric mood, hallucinations, self-injury, sleep disturbance and suicidal ideas. The patient is not nervous/anxious and is not hyperactive.    All other systems reviewed and are negative.      Objective   Physical Exam   Constitutional: He is oriented to person, place, and time. He appears well-developed and well-nourished.   HENT:   Head: Normocephalic and atraumatic.   Eyes: Conjunctivae and EOM are normal. Pupils are equal, round, and reactive to light.   Cardiovascular: Normal rate, regular rhythm and normal heart sounds.    Pulmonary/Chest: Effort normal and breath sounds normal.   Musculoskeletal: Normal range of motion.   Walking with cane   Neurological: He is alert and oriented to person, place, and time.   Skin: Skin is warm and dry.   Psychiatric: He has a normal mood and affect. His behavior is normal. Judgment and thought content normal.   Vitals reviewed.      Assessment/Plan   Abdelrahman was seen today for follow-up.    Diagnoses and all orders for this visit:    Pneumonia of both lungs due to Streptococcus pneumoniae, unspecified part of lung  -     CBC & Differential  -     Comprehensive Metabolic Panel  -     CBC Auto Differential    Tobacco abuse    Substance abuse    Mantle cell lymphoma of lymph nodes of multiple sites    reviewed hospital records,  Imaging and labs, recheck labs today and finish 1 more day levaquin, CBC improved and RTC 2 wks repeat CBC and CXR, enc smoking and pot cessation, denies meth usage and thinks may have spiked drink? Will monitor FU UDS 2 wks, cont FU with oncology and congratulated on alcohol cessation, Current outpatient and discharge medications have been reconciled for the patient.  Reviewed by: ILIANA Parrish

## 2018-05-29 NOTE — PATIENT INSTRUCTIONS
reviewed hospital records,  Imaging and labs, recheck labs today and finish 1 more day levaquin, CBC improved and RTC 2 wks repeat CBC and CXR, enc smoking and pot cessation, denies meth usage and thinks may have spiked drink? Will monitor FU UDS 2 wks, cont FU with oncology and congratulated on alcohol cessation, Current outpatient and discharge medications have been reconciled for the patient.  Reviewed by: ILIANA Parrish

## 2018-05-31 ENCOUNTER — TELEPHONE (OUTPATIENT)
Dept: FAMILY MEDICINE CLINIC | Facility: CLINIC | Age: 58
End: 2018-05-31

## 2018-05-31 RX ORDER — DIVALPROEX SODIUM 500 MG/1
500 TABLET, DELAYED RELEASE ORAL
Status: CANCELLED | OUTPATIENT
Start: 2018-05-31

## 2018-05-31 RX ORDER — LISINOPRIL 40 MG/1
40 TABLET ORAL DAILY
Qty: 90 TABLET | Refills: 0 | Status: CANCELLED | OUTPATIENT
Start: 2018-05-31

## 2018-05-31 RX ORDER — FOLIC ACID 1 MG/1
TABLET ORAL
Status: CANCELLED | OUTPATIENT
Start: 2018-05-31

## 2018-05-31 RX ORDER — RISPERIDONE 2 MG/1
TABLET ORAL
Status: CANCELLED | OUTPATIENT
Start: 2018-05-31

## 2018-06-01 RX ORDER — LISINOPRIL 40 MG/1
40 TABLET ORAL DAILY
Qty: 90 TABLET | Refills: 2 | Status: SHIPPED | OUTPATIENT
Start: 2018-06-01

## 2018-06-01 RX ORDER — FOLIC ACID 1 MG/1
1 TABLET ORAL DAILY
Qty: 90 TABLET | Refills: 2 | Status: SHIPPED | OUTPATIENT
Start: 2018-06-01

## 2018-06-01 RX ORDER — RISPERIDONE 2 MG/1
2 TABLET ORAL DAILY
Qty: 90 TABLET | Refills: 2 | Status: SHIPPED | OUTPATIENT
Start: 2018-06-01

## 2018-06-01 RX ORDER — DIVALPROEX SODIUM 500 MG/1
500 TABLET, DELAYED RELEASE ORAL 2 TIMES DAILY
Qty: 180 TABLET | Refills: 2 | Status: SHIPPED | OUTPATIENT
Start: 2018-06-01

## 2018-06-01 NOTE — TELEPHONE ENCOUNTER
Ok to refill lisinopril, is he seeing psych for other meds depakote, risperidone and folic acid refll?

## 2018-06-12 ENCOUNTER — TELEPHONE (OUTPATIENT)
Dept: FAMILY MEDICINE CLINIC | Facility: CLINIC | Age: 58
End: 2018-06-12

## 2018-06-15 ENCOUNTER — OFFICE VISIT (OUTPATIENT)
Dept: FAMILY MEDICINE CLINIC | Facility: CLINIC | Age: 58
End: 2018-06-15

## 2018-06-15 VITALS
WEIGHT: 164 LBS | TEMPERATURE: 97.9 F | OXYGEN SATURATION: 96 % | HEART RATE: 87 BPM | DIASTOLIC BLOOD PRESSURE: 70 MMHG | SYSTOLIC BLOOD PRESSURE: 138 MMHG | BODY MASS INDEX: 25.74 KG/M2 | HEIGHT: 67 IN

## 2018-06-15 DIAGNOSIS — R53.1 WEAKNESS: ICD-10-CM

## 2018-06-15 DIAGNOSIS — R50.9 FEVER AND CHILLS: ICD-10-CM

## 2018-06-15 DIAGNOSIS — D50.9 IRON DEFICIENCY ANEMIA, UNSPECIFIED IRON DEFICIENCY ANEMIA TYPE: ICD-10-CM

## 2018-06-15 DIAGNOSIS — J13 PNEUMONIA OF BOTH LUNGS DUE TO STREPTOCOCCUS PNEUMONIAE, UNSPECIFIED PART OF LUNG (HCC): Primary | ICD-10-CM

## 2018-06-15 DIAGNOSIS — Z72.0 TOBACCO ABUSE: ICD-10-CM

## 2018-06-15 DIAGNOSIS — F19.10 SUBSTANCE ABUSE (HCC): ICD-10-CM

## 2018-06-15 LAB
ERYTHROCYTE [DISTWIDTH] IN BLOOD BY AUTOMATED COUNT: 13.6 % (ref 4.5–15)
FERRITIN SERPL-MCNC: 344.4 NG/ML (ref 30–400)
FOLATE SERPL-MCNC: 17.74 NG/ML (ref 4.78–24.2)
HCT VFR BLD AUTO: 30.2 % (ref 35–60)
HGB BLD-MCNC: 10 G/DL (ref 13.5–18)
IRON 24H UR-MRATE: 9 MCG/DL (ref 59–158)
IRON SATN MFR SERPL: 4 % (ref 20–50)
LYMPHOCYTES # BLD AUTO: 1.3 10*3/MM3 (ref 1.2–3.4)
LYMPHOCYTES NFR BLD AUTO: 14 % (ref 21–51)
MCH RBC QN AUTO: 28.9 PG (ref 26.1–33.1)
MCHC RBC AUTO-ENTMCNC: 33.3 G/DL (ref 33–37)
MCV RBC AUTO: 86.9 FL (ref 80–99)
MONOCYTES # BLD AUTO: 0.4 10*3/MM3 (ref 0.1–0.6)
MONOCYTES NFR BLD AUTO: 3.9 % (ref 2–9)
NEUTROPHILS # BLD AUTO: 7.5 10*3/MM3 (ref 1.4–6.5)
NEUTROPHILS NFR BLD AUTO: 82.1 % (ref 42–75)
PLATELET # BLD AUTO: 392 10*3/MM3 (ref 150–450)
PMV BLD AUTO: 7 FL (ref 7.1–10.5)
RBC # BLD AUTO: 3.47 10*6/MM3 (ref 4–6)
TIBC SERPL-MCNC: 244 MCG/DL (ref 298–536)
TRANSFERRIN SERPL-MCNC: 164 MG/DL (ref 200–360)
VIT B12 BLD-MCNC: 1113 PG/ML (ref 211–946)
WBC NRBC COR # BLD: 9.1 10*3/MM3 (ref 4.5–10)

## 2018-06-15 PROCEDURE — 36415 COLL VENOUS BLD VENIPUNCTURE: CPT | Performed by: NURSE PRACTITIONER

## 2018-06-15 PROCEDURE — 82728 ASSAY OF FERRITIN: CPT | Performed by: NURSE PRACTITIONER

## 2018-06-15 PROCEDURE — 82746 ASSAY OF FOLIC ACID SERUM: CPT | Performed by: NURSE PRACTITIONER

## 2018-06-15 PROCEDURE — 85025 COMPLETE CBC W/AUTO DIFF WBC: CPT | Performed by: NURSE PRACTITIONER

## 2018-06-15 PROCEDURE — 99214 OFFICE O/P EST MOD 30 MIN: CPT | Performed by: NURSE PRACTITIONER

## 2018-06-15 PROCEDURE — 83540 ASSAY OF IRON: CPT | Performed by: NURSE PRACTITIONER

## 2018-06-15 PROCEDURE — 82607 VITAMIN B-12: CPT | Performed by: NURSE PRACTITIONER

## 2018-06-15 PROCEDURE — 84466 ASSAY OF TRANSFERRIN: CPT | Performed by: NURSE PRACTITIONER

## 2018-06-15 PROCEDURE — 71046 X-RAY EXAM CHEST 2 VIEWS: CPT | Performed by: NURSE PRACTITIONER

## 2018-06-15 RX ORDER — FERROUS SULFATE 325(65) MG
325 TABLET ORAL 2 TIMES DAILY WITH MEALS
Qty: 60 TABLET | Refills: 5 | Status: SHIPPED | OUTPATIENT
Start: 2018-06-15 | End: 2018-07-09 | Stop reason: SDUPTHER

## 2018-06-15 RX ORDER — LEVOFLOXACIN 750 MG/1
750 TABLET ORAL DAILY
Qty: 7 TABLET | Refills: 0 | Status: SHIPPED | OUTPATIENT
Start: 2018-06-15

## 2018-06-15 NOTE — PATIENT INSTRUCTIONS
CBC recheck shows hemoglobin low will check anemia panel, CXR shows R pulmonary effusion and pneumonia failed outpatient treatment direct admit to [facility] refuse rakesh Teixeira, emiliano smoking cessation, recheck UDS today

## 2018-06-15 NOTE — PROGRESS NOTES
Subjective   Abdelrahman Murray is a 58 y.o. male.     History of Present Illness   C/o fever beginning last 2 days with cold chills, fatigue, and prod cough, no SOA or wheezing, with ear pain, no sinus tenderness or congestion, no sore throat, with body aches and concerned about hepitits, but no known exposure, recently seen at [facility] hospitalization 05/21/18-05/25/18 for acute respiratory distress and altered mental status beginning 05/20/18, was seen in ICU B pneumonia and bacteremia, with mantle cell lymphoma, left AMA d/t poor nursing staff and walking 1 mile towards home before wife found him, called here to continue abx therapy outpatient tx finished levaquin 750 mg 1 PO daily x 1 wk and due for CXR repeat for resolution, no longer drinking alcohol x 1 mo but smokes pot with positive drug screen marijuana and meth but denies recent meth states did take benadryl before hospital, still smoking tobacco but states trying to decrease amount, with CT abd and pelvis showing pna and sl enlarged lymph node L subclavicular, sees [facility] for lymphoma and states recent screening 1 mo ago normal wasn't eligible for treatment until stopped drinking and plans to FU with them in 4 mo, last colonoscopy age 56 normal, with chronic abd pain and constipation but denies blood in stool NV or abd distension, with hx of folic acid deficiency and states just restarted medication    The following portions of the patient's history were reviewed and updated as appropriate: allergies, current medications, past family history, past medical history, past social history, past surgical history and problem list.    Review of Systems   Constitutional: Positive for chills. Negative for activity change, appetite change, diaphoresis, fatigue and fever.   HENT: Positive for congestion and sinus pressure. Negative for dental problem, drooling, ear discharge, ear pain, facial swelling, hearing loss, mouth sores, nosebleeds, postnasal drip,  rhinorrhea, sinus pain, sneezing, sore throat, tinnitus, trouble swallowing and voice change.    Respiratory: Positive for cough. Negative for chest tightness, shortness of breath and wheezing.    Cardiovascular: Negative for chest pain, palpitations and leg swelling.   Gastrointestinal: Positive for abdominal pain and constipation. Negative for abdominal distention, anal bleeding, blood in stool, diarrhea, nausea, rectal pain and vomiting.   Musculoskeletal: Positive for arthralgias and back pain. Negative for gait problem, joint swelling, myalgias, neck pain and neck stiffness.   Allergic/Immunologic: Positive for environmental allergies.   Neurological: Positive for weakness. Negative for dizziness, tremors, seizures, syncope, facial asymmetry, speech difficulty, light-headedness, numbness and headaches.   Psychiatric/Behavioral: Positive for agitation and behavioral problems.   All other systems reviewed and are negative.      Objective   Physical Exam   Constitutional: He is oriented to person, place, and time. He appears well-developed and well-nourished.   HENT:   Head: Normocephalic and atraumatic.   Eyes: Conjunctivae and EOM are normal. Pupils are equal, round, and reactive to light.   Cardiovascular: Normal rate, regular rhythm and normal heart sounds.    Pulmonary/Chest: Effort normal and breath sounds normal.   diminished lung sounds   Musculoskeletal: Normal range of motion.   Neurological: He is alert and oriented to person, place, and time.   Skin: Skin is warm and dry.   Psychiatric: He has a normal mood and affect. His behavior is normal. Judgment and thought content normal.   Vitals reviewed.  chest xray 2v without comparison for PNA FU from ER shows R pleural effusion and pneumonia via STAT radiology overread     Assessment/Plan   Abdelrahman was seen today for follow-up.    Diagnoses and all orders for this visit:    Pneumonia of both lungs due to Streptococcus pneumoniae, unspecified part of lung  -      CBC & Differential  -     CBC Auto Differential  -     XR Chest PA & Lateral    Tobacco abuse    Substance abuse  -     Compliance Drug Analysis, Ur - Urine, Clean Catch    Weakness    Fever and chills    Iron deficiency anemia, unspecified iron deficiency anemia type  -     Ferritin  -     Iron Profile  -     Vitamin B12 & Folate  -     Occult Blood X 1, Stool - Stool, Per Rectum    Other orders  -     ferrous sulfate 325 (65 FE) MG tablet; Take 1 tablet by mouth 2 (Two) Times a Day With Meals. With food  -     levoFLOXacin (LEVAQUIN) 750 MG tablet; Take 1 tablet by mouth Daily.    CBC recheck shows hemoglobin low will check anemia panel, CXR shows R pulmonary effusion and pneumonia failed outpatient treatment direct admit to [facility] sana Teixeira, emiliano smoking cessation, recheck UDS today

## 2018-06-18 ENCOUNTER — TELEPHONE (OUTPATIENT)
Dept: FAMILY MEDICINE CLINIC | Facility: CLINIC | Age: 58
End: 2018-06-18
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
## 2018-06-20 ENCOUNTER — TELEPHONE (OUTPATIENT)
Dept: FAMILY MEDICINE CLINIC | Facility: CLINIC | Age: 58
End: 2018-06-20

## 2018-06-22 LAB — CONV REPORT SUMMARY: NORMAL

## 2018-06-29 ENCOUNTER — TELEPHONE (OUTPATIENT)
Dept: FAMILY MEDICINE CLINIC | Facility: CLINIC | Age: 58
End: 2018-06-29

## 2018-07-02 ENCOUNTER — TELEPHONE (OUTPATIENT)
Dept: FAMILY MEDICINE CLINIC | Facility: CLINIC | Age: 58
End: 2018-07-02

## 2018-07-09 RX ORDER — FERROUS SULFATE 325(65) MG
325 TABLET ORAL 2 TIMES DAILY WITH MEALS
Qty: 180 TABLET | Refills: 0 | Status: SHIPPED | OUTPATIENT
Start: 2018-07-09 | End: 2018-07-20 | Stop reason: SDUPTHER

## 2018-07-14 DIAGNOSIS — E78.49 OTHER HYPERLIPIDEMIA: ICD-10-CM

## 2018-07-14 DIAGNOSIS — E11.9 DIABETES MELLITUS WITHOUT COMPLICATION (HCC): ICD-10-CM

## 2018-07-14 DIAGNOSIS — I10 HYPERTENSION, UNSPECIFIED TYPE: ICD-10-CM

## 2018-07-20 ENCOUNTER — TELEPHONE (OUTPATIENT)
Dept: FAMILY MEDICINE CLINIC | Facility: CLINIC | Age: 58
End: 2018-07-20

## 2018-07-20 RX ORDER — FERROUS SULFATE 325(65) MG
325 TABLET ORAL 2 TIMES DAILY WITH MEALS
Qty: 60 TABLET | Refills: 0 | Status: SHIPPED | OUTPATIENT
Start: 2018-07-20

## 2018-07-20 RX ORDER — METOPROLOL TARTRATE 50 MG/1
50 TABLET, FILM COATED ORAL DAILY
Qty: 30 TABLET | Refills: 0 | Status: SHIPPED | OUTPATIENT
Start: 2018-07-20

## 2018-12-22 DIAGNOSIS — I10 HYPERTENSION, UNSPECIFIED TYPE: ICD-10-CM

## 2018-12-22 DIAGNOSIS — E11.9 DIABETES MELLITUS WITHOUT COMPLICATION (HCC): ICD-10-CM

## 2018-12-22 DIAGNOSIS — E78.49 OTHER HYPERLIPIDEMIA: ICD-10-CM

## 2019-01-01 DIAGNOSIS — I10 HYPERTENSION, UNSPECIFIED TYPE: ICD-10-CM

## 2019-01-01 DIAGNOSIS — E78.49 OTHER HYPERLIPIDEMIA: ICD-10-CM

## 2019-01-01 DIAGNOSIS — E11.9 DIABETES MELLITUS WITHOUT COMPLICATION (HCC): ICD-10-CM

## 2019-01-01 RX ORDER — FLUOXETINE HYDROCHLORIDE 40 MG/1
CAPSULE ORAL
Qty: 180 CAPSULE | Refills: 3 | OUTPATIENT
Start: 2019-01-01

## 2019-01-29 RX ORDER — FOLIC ACID 1 MG/1
TABLET ORAL
Qty: 90 TABLET | Refills: 2 | OUTPATIENT
Start: 2019-01-29

## 2019-01-29 RX ORDER — RISPERIDONE 2 MG/1
TABLET ORAL
Qty: 90 TABLET | Refills: 2 | OUTPATIENT
Start: 2019-01-29

## 2019-02-07 RX ORDER — DIVALPROEX SODIUM 500 MG/1
TABLET, DELAYED RELEASE ORAL
Qty: 180 TABLET | Refills: 2 | OUTPATIENT
Start: 2019-02-07

## 2021-08-27 NOTE — PROGRESS NOTES
Continued Stay Note   [facility]     Patient Name: Abdelrahman Murray  MRN: [id number]  Today's Date: 5/24/2018    Admit Date: 5/21/2018          Discharge Plan     Row Name 05/24/18 1758       Plan    Plan home    Plan Comments Spoke with pt and his wife at bedside.  He confirms plan to return home with his wife upon DC.  CCP will continue to follow.              Discharge Codes    No documentation.           Marsha Adams RN     show

## 2021-09-21 ENCOUNTER — APPOINTMENT (OUTPATIENT)
Dept: GENERAL RADIOLOGY | Facility: HOSPITAL | Age: 61
End: 2021-09-21

## 2021-09-21 ENCOUNTER — HOSPITAL ENCOUNTER (EMERGENCY)
Facility: HOSPITAL | Age: 61
Discharge: HOME | End: 2021-09-21
Attending: EMERGENCY MEDICINE | Admitting: EMERGENCY MEDICINE

## 2021-09-21 VITALS
DIASTOLIC BLOOD PRESSURE: 91 MMHG | SYSTOLIC BLOOD PRESSURE: 150 MMHG | TEMPERATURE: 97.6 F | HEART RATE: 85 BPM | OXYGEN SATURATION: 96 % | RESPIRATION RATE: 20 BRPM

## 2021-09-21 DIAGNOSIS — S81.832A: Primary | ICD-10-CM

## 2021-09-21 PROCEDURE — 90471 IMMUNIZATION ADMIN: CPT | Performed by: NURSE PRACTITIONER

## 2021-09-21 PROCEDURE — 73590 X-RAY EXAM OF LOWER LEG: CPT

## 2021-09-21 PROCEDURE — 99282 EMERGENCY DEPT VISIT SF MDM: CPT

## 2021-09-21 PROCEDURE — 25010000002 TDAP 5-2.5-18.5 LF-MCG/0.5 SUSPENSION: Performed by: NURSE PRACTITIONER

## 2021-09-21 PROCEDURE — 90715 TDAP VACCINE 7 YRS/> IM: CPT | Performed by: NURSE PRACTITIONER

## 2021-09-21 PROCEDURE — 73552 X-RAY EXAM OF FEMUR 2/>: CPT

## 2021-09-21 RX ORDER — CEPHALEXIN 500 MG/1
500 CAPSULE ORAL 3 TIMES DAILY
Qty: 21 CAPSULE | Refills: 0 | Status: SHIPPED | OUTPATIENT
Start: 2021-09-21 | End: 2021-09-28

## 2021-09-21 RX ADMIN — TETANUS TOXOID, REDUCED DIPHTHERIA TOXOID AND ACELLULAR PERTUSSIS VACCINE, ADSORBED 0.5 ML: 5; 2.5; 8; 8; 2.5 SUSPENSION INTRAMUSCULAR at 22:55

## 2021-09-22 NOTE — ED NOTES
This tech wore PPE face shield, gloves, and N95 mask while in pt room.     Mervat Taylor  09/21/21 7737
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
## 2021-09-22 NOTE — DISCHARGE INSTRUCTIONS
Please expect some bruising and swelling to the left leg    For the wounds: leave the dressings on for 24 hours.  After 24 hours remove dressings, wash the area with soap and water.  Then apply a film of over the counter triple antibiotic ointment and cover with bandaid.    Elevate leg and use ice for pain/swelling.  It is ok to take your home pain medication or Tylenol as needed.      Return Precautions    Although you are being discharged from the ED today, I encourage you to return for worsening symptoms.  Things can, and do, change such that treatment at home with medication may not be adequate.      Specifically, return for any of the following:  SEVERE SWELLING, FOOT BECOMING COLD, ACTIVE PROFUSE BLEEDING OR OTHER CONCERNS.    Chest pain, shortness of breath, pain or nausea and vomiting not controlled by medications provided.    Please make a follow up with your Primary Care Provider for a blood pressure recheck.
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
## 2023-03-15 NOTE — TELEPHONE ENCOUNTER
1. Continue cardiac medications   2. Complete additional cardiac testing already scheduled.  3. Follow up with Dr. Delcid as scheduled    3/5 l/m tcb

## 2023-06-12 NOTE — PATIENT INSTRUCTIONS
Refilled metformin, metoprolol, lisinopril, omeprazole, singular, and lexapro, unable to refill gabapentin d/t no [place] license.   Cont lexapro and atarax as prescribed.  Refer to psychiatry, list of names given to patient.   If any suicidal ideations or dysphoric thoughts advised to go to the ER.  Increase fluid intake, get plenty of rest.   Patient agrees with plan of care and understands instructions. Call if worsening symptoms or any problems or concerns.     
Pt received semi supine in bed +IV, +MONSE x3, +pulse ox, +tele, +BP cuff,